# Patient Record
Sex: FEMALE | Race: WHITE | NOT HISPANIC OR LATINO | URBAN - METROPOLITAN AREA
[De-identification: names, ages, dates, MRNs, and addresses within clinical notes are randomized per-mention and may not be internally consistent; named-entity substitution may affect disease eponyms.]

---

## 2018-08-21 ENCOUNTER — COMMUNICATION - HEALTHEAST (OUTPATIENT)
Dept: TELEHEALTH | Facility: CLINIC | Age: 43
End: 2018-08-21

## 2018-08-21 ENCOUNTER — OFFICE VISIT - HEALTHEAST (OUTPATIENT)
Dept: FAMILY MEDICINE | Facility: CLINIC | Age: 43
End: 2018-08-21

## 2018-08-21 DIAGNOSIS — R53.82 CHRONIC FATIGUE: ICD-10-CM

## 2018-08-21 LAB
BASOPHILS # BLD AUTO: 0 THOU/UL (ref 0–0.2)
BASOPHILS NFR BLD AUTO: 1 % (ref 0–2)
EOSINOPHIL # BLD AUTO: 0.1 THOU/UL (ref 0–0.4)
EOSINOPHIL NFR BLD AUTO: 2 % (ref 0–6)
ERYTHROCYTE [DISTWIDTH] IN BLOOD BY AUTOMATED COUNT: 12 % (ref 11–14.5)
FERRITIN SERPL-MCNC: 17 NG/ML (ref 10–130)
HCT VFR BLD AUTO: 37.6 % (ref 35–47)
HGB BLD-MCNC: 12.7 G/DL (ref 12–16)
LYMPHOCYTES # BLD AUTO: 1.5 THOU/UL (ref 0.8–4.4)
LYMPHOCYTES NFR BLD AUTO: 23 % (ref 20–40)
MCH RBC QN AUTO: 29.2 PG (ref 27–34)
MCHC RBC AUTO-ENTMCNC: 33.8 G/DL (ref 32–36)
MCV RBC AUTO: 86 FL (ref 80–100)
MONOCYTES # BLD AUTO: 0.4 THOU/UL (ref 0–0.9)
MONOCYTES NFR BLD AUTO: 7 % (ref 2–10)
NEUTROPHILS # BLD AUTO: 4.4 THOU/UL (ref 2–7.7)
NEUTROPHILS NFR BLD AUTO: 68 % (ref 50–70)
PLATELET # BLD AUTO: 264 THOU/UL (ref 140–440)
PMV BLD AUTO: 7.2 FL (ref 7–10)
RBC # BLD AUTO: 4.35 MILL/UL (ref 3.8–5.4)
TSH SERPL DL<=0.005 MIU/L-ACNC: 0.99 UIU/ML (ref 0.3–5)
WBC: 6.5 THOU/UL (ref 4–11)

## 2018-08-21 RX ORDER — LORAZEPAM 0.5 MG/1
TABLET ORAL
Status: SHIPPED | COMMUNITY
Start: 2018-01-10

## 2018-08-21 ASSESSMENT — MIFFLIN-ST. JEOR: SCORE: 1317.87

## 2018-08-22 ENCOUNTER — COMMUNICATION - HEALTHEAST (OUTPATIENT)
Dept: FAMILY MEDICINE | Facility: CLINIC | Age: 43
End: 2018-08-22

## 2018-08-22 LAB — 25(OH)D3 SERPL-MCNC: 22 NG/ML (ref 30–80)

## 2018-08-24 ENCOUNTER — COMMUNICATION - HEALTHEAST (OUTPATIENT)
Dept: FAMILY MEDICINE | Facility: CLINIC | Age: 43
End: 2018-08-24

## 2019-05-15 ENCOUNTER — RECORDS - HEALTHEAST (OUTPATIENT)
Dept: ADMINISTRATIVE | Facility: OTHER | Age: 44
End: 2019-05-15

## 2019-10-03 ENCOUNTER — OFFICE VISIT - HEALTHEAST (OUTPATIENT)
Dept: FAMILY MEDICINE | Facility: CLINIC | Age: 44
End: 2019-10-03

## 2019-10-03 ENCOUNTER — HOSPITAL ENCOUNTER (OUTPATIENT)
Dept: LAB | Age: 44
Setting detail: SPECIMEN
Discharge: HOME | End: 2019-10-03

## 2019-10-03 DIAGNOSIS — G93.5 CHIARI I MALFORMATION (H): ICD-10-CM

## 2019-10-03 DIAGNOSIS — R55 VASOVAGAL EPISODE: ICD-10-CM

## 2019-10-03 DIAGNOSIS — E55.9 VITAMIN D DEFICIENCY: ICD-10-CM

## 2019-10-03 DIAGNOSIS — Z23 NEED FOR IMMUNIZATION AGAINST INFLUENZA: ICD-10-CM

## 2019-10-03 DIAGNOSIS — Z23 NEED FOR TD VACCINE: ICD-10-CM

## 2019-10-03 DIAGNOSIS — E61.1 IRON DEFICIENCY: ICD-10-CM

## 2019-10-03 LAB
BASOPHILS # BLD AUTO: 0 THOU/UL (ref 0–0.2)
BASOPHILS NFR BLD AUTO: 0 % (ref 0–2)
EOSINOPHIL # BLD AUTO: 0.1 THOU/UL (ref 0–0.4)
EOSINOPHIL NFR BLD AUTO: 2 % (ref 0–6)
ERYTHROCYTE [DISTWIDTH] IN BLOOD BY AUTOMATED COUNT: 11.7 % (ref 11–14.5)
FERRITIN SERPL-MCNC: 7 NG/ML (ref 10–130)
HCT VFR BLD AUTO: 39.1 % (ref 35–47)
HGB BLD-MCNC: 13.5 G/DL (ref 12–16)
IRON SATN MFR SERPL: 37 % (ref 20–50)
IRON SERPL-MCNC: 155 UG/DL (ref 42–175)
LYMPHOCYTES # BLD AUTO: 1.5 THOU/UL (ref 0.8–4.4)
LYMPHOCYTES NFR BLD AUTO: 30 % (ref 20–40)
MCH RBC QN AUTO: 28.7 PG (ref 27–34)
MCHC RBC AUTO-ENTMCNC: 34.4 G/DL (ref 32–36)
MCV RBC AUTO: 83 FL (ref 80–100)
MONOCYTES # BLD AUTO: 0.4 THOU/UL (ref 0–0.9)
MONOCYTES NFR BLD AUTO: 8 % (ref 2–10)
NEUTROPHILS # BLD AUTO: 3.1 THOU/UL (ref 2–7.7)
NEUTROPHILS NFR BLD AUTO: 60 % (ref 50–70)
PLATELET # BLD AUTO: 250 THOU/UL (ref 140–440)
PMV BLD AUTO: 7.7 FL (ref 7–10)
RBC # BLD AUTO: 4.68 MILL/UL (ref 3.8–5.4)
TIBC SERPL-MCNC: 421 UG/DL (ref 313–563)
TRANSFERRIN SERPL-MCNC: 337 MG/DL (ref 212–360)
WBC: 5.1 THOU/UL (ref 4–11)

## 2019-10-03 ASSESSMENT — PATIENT HEALTH QUESTIONNAIRE - PHQ9: SUM OF ALL RESPONSES TO PHQ QUESTIONS 1-9: 3

## 2019-10-03 ASSESSMENT — MIFFLIN-ST. JEOR: SCORE: 1286.4

## 2019-10-04 ENCOUNTER — COMMUNICATION - HEALTHEAST (OUTPATIENT)
Dept: FAMILY MEDICINE | Facility: CLINIC | Age: 44
End: 2019-10-04

## 2019-10-04 DIAGNOSIS — E61.1 IRON DEFICIENCY: ICD-10-CM

## 2019-10-04 LAB
25(OH)D3 SERPL-MCNC: 28.4 NG/ML (ref 30–80)
25(OH)D3 SERPL-MCNC: 28.4 NG/ML (ref 30–80)

## 2019-10-09 ENCOUNTER — COMMUNICATION - HEALTHEAST (OUTPATIENT)
Dept: ADMINISTRATIVE | Facility: HOSPITAL | Age: 44
End: 2019-10-09

## 2019-10-23 ENCOUNTER — OFFICE VISIT - HEALTHEAST (OUTPATIENT)
Dept: ONCOLOGY | Facility: CLINIC | Age: 44
End: 2019-10-23

## 2019-10-23 DIAGNOSIS — D50.0 IRON DEFICIENCY ANEMIA DUE TO CHRONIC BLOOD LOSS: ICD-10-CM

## 2019-10-23 DIAGNOSIS — N92.0 MENORRHAGIA WITH REGULAR CYCLE: ICD-10-CM

## 2019-10-23 DIAGNOSIS — R53.82 CHRONIC FATIGUE: ICD-10-CM

## 2019-10-23 DIAGNOSIS — R35.89 POLYURIA: ICD-10-CM

## 2019-10-23 LAB
FERRITIN SERPL-MCNC: 5 NG/ML (ref 10–130)
FOLATE SERPL-MCNC: 7.7 NG/ML
HBA1C MFR BLD: 5 % (ref 4.2–6.1)
IRON SATN MFR SERPL: 13 % (ref 20–50)
IRON SERPL-MCNC: 59 UG/DL (ref 42–175)
TIBC SERPL-MCNC: 449 UG/DL (ref 313–563)
TRANSFERRIN SERPL-MCNC: 360 MG/DL (ref 212–360)
TSH SERPL DL<=0.005 MIU/L-ACNC: 1.17 UIU/ML (ref 0.3–5)
VIT B12 SERPL-MCNC: 366 PG/ML (ref 213–816)

## 2019-10-23 ASSESSMENT — MIFFLIN-ST. JEOR: SCORE: 1275.69

## 2019-11-26 ENCOUNTER — AMBULATORY - HEALTHEAST (OUTPATIENT)
Dept: INFUSION THERAPY | Facility: CLINIC | Age: 44
End: 2019-11-26

## 2019-11-26 DIAGNOSIS — D50.0 IRON DEFICIENCY ANEMIA DUE TO CHRONIC BLOOD LOSS: ICD-10-CM

## 2019-11-26 LAB
BASOPHILS # BLD AUTO: 0.1 THOU/UL (ref 0–0.2)
BASOPHILS NFR BLD AUTO: 1 % (ref 0–2)
EOSINOPHIL # BLD AUTO: 0.1 THOU/UL (ref 0–0.4)
EOSINOPHIL NFR BLD AUTO: 2 % (ref 0–6)
ERYTHROCYTE [DISTWIDTH] IN BLOOD BY AUTOMATED COUNT: 12.6 % (ref 11–14.5)
FERRITIN SERPL-MCNC: 8 NG/ML (ref 10–130)
HCT VFR BLD AUTO: 39.1 % (ref 35–47)
HGB BLD-MCNC: 12.5 G/DL (ref 12–16)
IRON SATN MFR SERPL: 38 % (ref 20–50)
IRON SERPL-MCNC: 149 UG/DL (ref 42–175)
LYMPHOCYTES # BLD AUTO: 1.3 THOU/UL (ref 0.8–4.4)
LYMPHOCYTES NFR BLD AUTO: 28 % (ref 20–40)
MCH RBC QN AUTO: 28.3 PG (ref 27–34)
MCHC RBC AUTO-ENTMCNC: 32 G/DL (ref 32–36)
MCV RBC AUTO: 89 FL (ref 80–100)
MONOCYTES # BLD AUTO: 0.4 THOU/UL (ref 0–0.9)
MONOCYTES NFR BLD AUTO: 9 % (ref 2–10)
NEUTROPHILS # BLD AUTO: 2.6 THOU/UL (ref 2–7.7)
NEUTROPHILS NFR BLD AUTO: 59 % (ref 50–70)
PLATELET # BLD AUTO: 255 THOU/UL (ref 140–440)
PMV BLD AUTO: 9.5 FL (ref 8.5–12.5)
RBC # BLD AUTO: 4.41 MILL/UL (ref 3.8–5.4)
TIBC SERPL-MCNC: 391 UG/DL (ref 313–563)
TRANSFERRIN SERPL-MCNC: 312 MG/DL (ref 212–360)
WBC: 4.4 THOU/UL (ref 4–11)

## 2019-12-03 ENCOUNTER — OFFICE VISIT - HEALTHEAST (OUTPATIENT)
Dept: ONCOLOGY | Facility: CLINIC | Age: 44
End: 2019-12-03

## 2019-12-03 DIAGNOSIS — M54.89 PAIN IN LEFT PARASPINAL REGION: ICD-10-CM

## 2019-12-03 DIAGNOSIS — D50.0 IRON DEFICIENCY ANEMIA DUE TO CHRONIC BLOOD LOSS: ICD-10-CM

## 2019-12-03 DIAGNOSIS — R53.83 FATIGUE, UNSPECIFIED TYPE: ICD-10-CM

## 2019-12-03 LAB
ALBUMIN UR-MCNC: NEGATIVE MG/DL
APPEARANCE UR: CLEAR
BACTERIA #/AREA URNS HPF: ABNORMAL HPF
BILIRUB UR QL STRIP: NEGATIVE
COLOR UR AUTO: YELLOW
GLUCOSE UR STRIP-MCNC: NEGATIVE MG/DL
HGB UR QL STRIP: NEGATIVE
KETONES UR STRIP-MCNC: NEGATIVE MG/DL
LEUKOCYTE ESTERASE UR QL STRIP: ABNORMAL
MUCOUS THREADS #/AREA URNS LPF: ABNORMAL LPF
NITRATE UR QL: NEGATIVE
PH UR STRIP: 6 [PH] (ref 4.5–8)
RBC #/AREA URNS AUTO: ABNORMAL HPF
SP GR UR STRIP: 1.01 (ref 1–1.03)
SQUAMOUS #/AREA URNS AUTO: ABNORMAL LPF
UROBILINOGEN UR STRIP-ACNC: ABNORMAL
WBC #/AREA URNS AUTO: ABNORMAL HPF

## 2019-12-04 ENCOUNTER — COMMUNICATION - HEALTHEAST (OUTPATIENT)
Dept: SCHEDULING | Facility: CLINIC | Age: 44
End: 2019-12-04

## 2019-12-04 LAB — BACTERIA SPEC CULT: NO GROWTH

## 2019-12-06 ENCOUNTER — OFFICE VISIT - HEALTHEAST (OUTPATIENT)
Dept: FAMILY MEDICINE | Facility: CLINIC | Age: 44
End: 2019-12-06

## 2019-12-06 DIAGNOSIS — R10.9 ABDOMINAL DISCOMFORT IN LEFT FLANK: ICD-10-CM

## 2019-12-06 ASSESSMENT — ANXIETY QUESTIONNAIRES
7. FEELING AFRAID AS IF SOMETHING AWFUL MIGHT HAPPEN: NOT AT ALL
IF YOU CHECKED OFF ANY PROBLEMS ON THIS QUESTIONNAIRE, HOW DIFFICULT HAVE THESE PROBLEMS MADE IT FOR YOU TO DO YOUR WORK, TAKE CARE OF THINGS AT HOME, OR GET ALONG WITH OTHER PEOPLE: NOT DIFFICULT AT ALL
3. WORRYING TOO MUCH ABOUT DIFFERENT THINGS: NOT AT ALL
1. FEELING NERVOUS, ANXIOUS, OR ON EDGE: SEVERAL DAYS
5. BEING SO RESTLESS THAT IT IS HARD TO SIT STILL: SEVERAL DAYS
GAD7 TOTAL SCORE: 3
2. NOT BEING ABLE TO STOP OR CONTROL WORRYING: NOT AT ALL
4. TROUBLE RELAXING: SEVERAL DAYS
6. BECOMING EASILY ANNOYED OR IRRITABLE: NOT AT ALL

## 2019-12-06 ASSESSMENT — PATIENT HEALTH QUESTIONNAIRE - PHQ9: SUM OF ALL RESPONSES TO PHQ QUESTIONS 1-9: 4

## 2019-12-09 ENCOUNTER — HOSPITAL ENCOUNTER (OUTPATIENT)
Dept: ULTRASOUND IMAGING | Facility: CLINIC | Age: 44
Discharge: HOME | End: 2019-12-09
Attending: FAMILY MEDICINE

## 2019-12-09 DIAGNOSIS — R10.9 ABDOMINAL DISCOMFORT IN LEFT FLANK: ICD-10-CM

## 2019-12-10 ENCOUNTER — AMBULATORY - HEALTHEAST (OUTPATIENT)
Dept: FAMILY MEDICINE | Facility: CLINIC | Age: 44
End: 2019-12-10

## 2019-12-10 DIAGNOSIS — R10.9 ABDOMINAL DISCOMFORT IN LEFT FLANK: ICD-10-CM

## 2019-12-10 DIAGNOSIS — R93.429 ABNORMAL ULTRASOUND OF KIDNEY: ICD-10-CM

## 2019-12-11 ENCOUNTER — HOSPITAL ENCOUNTER (OUTPATIENT)
Dept: CT IMAGING | Facility: CLINIC | Age: 44
Discharge: HOME | End: 2019-12-11
Attending: FAMILY MEDICINE

## 2019-12-11 ENCOUNTER — COMMUNICATION - HEALTHEAST (OUTPATIENT)
Dept: FAMILY MEDICINE | Facility: CLINIC | Age: 44
End: 2019-12-11

## 2019-12-11 ENCOUNTER — COMMUNICATION - HEALTHEAST (OUTPATIENT)
Dept: SCHEDULING | Facility: CLINIC | Age: 44
End: 2019-12-11

## 2019-12-11 DIAGNOSIS — R93.429 ABNORMAL ULTRASOUND OF KIDNEY: ICD-10-CM

## 2019-12-11 DIAGNOSIS — R10.9 ABDOMINAL DISCOMFORT IN LEFT FLANK: ICD-10-CM

## 2019-12-13 ENCOUNTER — AMBULATORY - HEALTHEAST (OUTPATIENT)
Dept: LAB | Facility: CLINIC | Age: 44
End: 2019-12-13

## 2019-12-13 DIAGNOSIS — R10.9 ABDOMINAL DISCOMFORT IN LEFT FLANK: ICD-10-CM

## 2019-12-13 DIAGNOSIS — R93.429 ABNORMAL ULTRASOUND OF KIDNEY: ICD-10-CM

## 2019-12-13 LAB
ANION GAP SERPL CALCULATED.3IONS-SCNC: 8 MMOL/L (ref 5–18)
BUN SERPL-MCNC: 12 MG/DL (ref 8–22)
CALCIUM SERPL-MCNC: 9.5 MG/DL (ref 8.5–10.5)
CHLORIDE BLD-SCNC: 103 MMOL/L (ref 98–107)
CO2 SERPL-SCNC: 27 MMOL/L (ref 22–31)
CREAT SERPL-MCNC: 0.81 MG/DL (ref 0.6–1.1)
GFR SERPL CREATININE-BSD FRML MDRD: >60 ML/MIN/1.73M2
GLUCOSE BLD-MCNC: 88 MG/DL (ref 70–125)
POTASSIUM BLD-SCNC: 4.3 MMOL/L (ref 3.5–5)
SODIUM SERPL-SCNC: 138 MMOL/L (ref 136–145)

## 2020-06-25 ENCOUNTER — OFFICE VISIT - HEALTHEAST (OUTPATIENT)
Dept: FAMILY MEDICINE | Facility: CLINIC | Age: 45
End: 2020-06-25

## 2020-06-25 DIAGNOSIS — H00.011 HORDEOLUM EXTERNUM OF RIGHT UPPER EYELID: ICD-10-CM

## 2020-08-18 ENCOUNTER — COMMUNICATION - HEALTHEAST (OUTPATIENT)
Dept: FAMILY MEDICINE | Facility: CLINIC | Age: 45
End: 2020-08-18

## 2020-08-18 DIAGNOSIS — Z86.2 HX OF IRON DEFICIENCY ANEMIA: ICD-10-CM

## 2020-08-18 DIAGNOSIS — Z86.2 HISTORY OF ANEMIA: ICD-10-CM

## 2020-08-28 ENCOUNTER — AMBULATORY - HEALTHEAST (OUTPATIENT)
Dept: LAB | Facility: CLINIC | Age: 45
End: 2020-08-28

## 2020-08-28 DIAGNOSIS — Z86.2 HX OF IRON DEFICIENCY ANEMIA: ICD-10-CM

## 2020-08-28 LAB
ERYTHROCYTE [DISTWIDTH] IN BLOOD BY AUTOMATED COUNT: 13 % (ref 11–14.5)
FERRITIN SERPL-MCNC: 5 NG/ML (ref 10–130)
HCT VFR BLD AUTO: 37.2 % (ref 35–47)
HGB BLD-MCNC: 12.6 G/DL (ref 12–16)
IRON SATN MFR SERPL: 9 % (ref 20–50)
IRON SERPL-MCNC: 38 UG/DL (ref 42–175)
MCH RBC QN AUTO: 28.1 PG (ref 27–34)
MCHC RBC AUTO-ENTMCNC: 33.8 G/DL (ref 32–36)
MCV RBC AUTO: 83 FL (ref 80–100)
PLATELET # BLD AUTO: 235 THOU/UL (ref 140–440)
PMV BLD AUTO: 7.4 FL (ref 7–10)
RBC # BLD AUTO: 4.48 MILL/UL (ref 3.8–5.4)
TIBC SERPL-MCNC: 403 UG/DL (ref 313–563)
TRANSFERRIN SERPL-MCNC: 323 MG/DL (ref 212–360)
WBC: 5.1 THOU/UL (ref 4–11)

## 2020-09-08 ENCOUNTER — COMMUNICATION - HEALTHEAST (OUTPATIENT)
Dept: FAMILY MEDICINE | Facility: CLINIC | Age: 45
End: 2020-09-08

## 2020-09-09 ENCOUNTER — COMMUNICATION - HEALTHEAST (OUTPATIENT)
Dept: FAMILY MEDICINE | Facility: CLINIC | Age: 45
End: 2020-09-09

## 2020-09-25 ENCOUNTER — RECORDS - HEALTHEAST (OUTPATIENT)
Dept: ADMINISTRATIVE | Facility: OTHER | Age: 45
End: 2020-09-25

## 2020-10-06 ENCOUNTER — COMMUNICATION - HEALTHEAST (OUTPATIENT)
Dept: FAMILY MEDICINE | Facility: CLINIC | Age: 45
End: 2020-10-06

## 2020-10-06 ENCOUNTER — AMBULATORY - HEALTHEAST (OUTPATIENT)
Dept: SURGERY | Facility: AMBULATORY SURGERY CENTER | Age: 45
End: 2020-10-06

## 2020-10-06 DIAGNOSIS — Z11.59 ENCOUNTER FOR SCREENING FOR OTHER VIRAL DISEASES: ICD-10-CM

## 2020-10-14 ENCOUNTER — OFFICE VISIT - HEALTHEAST (OUTPATIENT)
Dept: FAMILY MEDICINE | Facility: CLINIC | Age: 45
End: 2020-10-14

## 2020-10-14 DIAGNOSIS — N93.9 ABNORMAL UTERINE BLEEDING: ICD-10-CM

## 2020-10-14 DIAGNOSIS — Z01.818 PREOP EXAMINATION: ICD-10-CM

## 2020-10-14 RX ORDER — VILAZODONE HYDROCHLORIDE 40 MG/1
TABLET ORAL
Status: SHIPPED | COMMUNITY
Start: 2020-06-01

## 2020-10-14 ASSESSMENT — MIFFLIN-ST. JEOR: SCORE: 1252.1

## 2020-10-16 ENCOUNTER — AMBULATORY - HEALTHEAST (OUTPATIENT)
Dept: LAB | Facility: CLINIC | Age: 45
End: 2020-10-16

## 2020-10-16 DIAGNOSIS — Z11.59 ENCOUNTER FOR SCREENING FOR OTHER VIRAL DISEASES: ICD-10-CM

## 2020-10-16 ASSESSMENT — MIFFLIN-ST. JEOR: SCORE: 1260.04

## 2020-10-18 ENCOUNTER — COMMUNICATION - HEALTHEAST (OUTPATIENT)
Dept: SCHEDULING | Facility: CLINIC | Age: 45
End: 2020-10-18

## 2020-10-20 ENCOUNTER — ANESTHESIA - HEALTHEAST (OUTPATIENT)
Dept: SURGERY | Facility: AMBULATORY SURGERY CENTER | Age: 45
End: 2020-10-20

## 2020-10-21 ENCOUNTER — SURGERY - HEALTHEAST (OUTPATIENT)
Dept: SURGERY | Facility: AMBULATORY SURGERY CENTER | Age: 45
End: 2020-10-21

## 2020-10-21 ASSESSMENT — MIFFLIN-ST. JEOR: SCORE: 1257.77

## 2020-11-01 ENCOUNTER — COMMUNICATION - HEALTHEAST (OUTPATIENT)
Dept: FAMILY MEDICINE | Facility: CLINIC | Age: 45
End: 2020-11-01

## 2020-11-01 DIAGNOSIS — M62.838 MUSCLE SPASM: ICD-10-CM

## 2020-11-01 DIAGNOSIS — M54.6 ACUTE RIGHT-SIDED THORACIC BACK PAIN: ICD-10-CM

## 2020-11-04 RX ORDER — CYCLOBENZAPRINE HCL 5 MG
5-10 TABLET ORAL 3 TIMES DAILY PRN
Qty: 30 TABLET | Refills: 0 | Status: SHIPPED | OUTPATIENT
Start: 2020-11-04

## 2020-12-18 ENCOUNTER — RECORDS - HEALTHEAST (OUTPATIENT)
Dept: ADMINISTRATIVE | Facility: OTHER | Age: 45
End: 2020-12-18

## 2021-05-29 ENCOUNTER — HEALTH MAINTENANCE LETTER (OUTPATIENT)
Age: 46
End: 2021-05-29

## 2021-06-08 ENCOUNTER — HOSPITAL ENCOUNTER (EMERGENCY)
Dept: EMERGENCY MEDICINE | Facility: CLINIC | Age: 46
Discharge: HOME | End: 2021-06-08
Admitting: EMERGENCY MEDICINE
Payer: COMMERCIAL

## 2021-06-08 DIAGNOSIS — M79.662 PAIN OF LEFT LOWER LEG: ICD-10-CM

## 2021-06-24 VITALS
BODY MASS INDEX: 22.86 KG/M2 | WEIGHT: 143.8 LBS | OXYGEN SATURATION: 97 % | HEIGHT: 67 IN | SYSTOLIC BLOOD PRESSURE: 129 MMHG | SYSTOLIC BLOOD PRESSURE: 122 MMHG | DIASTOLIC BLOOD PRESSURE: 74 MMHG | DIASTOLIC BLOOD PRESSURE: 60 MMHG | HEART RATE: 86 BPM | OXYGEN SATURATION: 98 % | HEART RATE: 75 BPM | TEMPERATURE: 97.5 F | WEIGHT: 140.44 LBS | SYSTOLIC BLOOD PRESSURE: 110 MMHG | BODY MASS INDEX: 21.14 KG/M2 | BODY MASS INDEX: 22.33 KG/M2 | TEMPERATURE: 97.7 F | WEIGHT: 134.7 LBS | HEART RATE: 74 BPM | DIASTOLIC BLOOD PRESSURE: 77 MMHG

## 2021-06-24 VITALS
HEIGHT: 67 IN | TEMPERATURE: 97.5 F | BODY MASS INDEX: 21.51 KG/M2 | SYSTOLIC BLOOD PRESSURE: 112 MMHG | DIASTOLIC BLOOD PRESSURE: 60 MMHG | HEART RATE: 69 BPM | OXYGEN SATURATION: 99 % | WEIGHT: 137.06 LBS

## 2021-06-24 VITALS
WEIGHT: 129.5 LBS | DIASTOLIC BLOOD PRESSURE: 60 MMHG | SYSTOLIC BLOOD PRESSURE: 104 MMHG | HEART RATE: 80 BPM | BODY MASS INDEX: 20.33 KG/M2 | TEMPERATURE: 97.1 F | HEIGHT: 67 IN | OXYGEN SATURATION: 94 %

## 2021-06-24 VITALS — BODY MASS INDEX: 22.6 KG/M2 | HEIGHT: 67 IN | WEIGHT: 144 LBS

## 2021-06-24 VITALS — BODY MASS INDEX: 20.25 KG/M2 | WEIGHT: 129 LBS | HEIGHT: 67 IN

## 2021-06-24 PROBLEM — N92.0 MENORRHAGIA WITH REGULAR CYCLE: Status: ACTIVE | Noted: 2019-10-23

## 2021-06-24 PROBLEM — G93.5 CHIARI I MALFORMATION (H): Status: ACTIVE | Noted: 2019-10-03

## 2021-06-24 ASSESSMENT — PATIENT HEALTH QUESTIONNAIRE - PHQ9
SUM OF ALL RESPONSES TO PHQ QUESTIONS 1-9: 4
SUM OF ALL RESPONSES TO PHQ QUESTIONS 1-9: 3

## 2021-06-24 NOTE — TELEPHONE ENCOUNTER
RN Assessment/Reason for Call:   Okay to leave Detailed Message  Paradise calling in, sent a My Chart for message.   U/S 2 small lesions left kidney.  Waiting for CT scan.  Friend recommend they check her kidneys.  Pain/dull ache.    RN Action/Disposition:  Protocol recommends home care;if no relief from pain meds call back in 2 hrs.;& see Dr in 24 hrs.  Call back if worse symptoms  Discussed home care measures.  Agrees to plan.     Jeni Ellison RN    [facility] Triage/med refill  12/11/2019  2:34 PM        Reason for Disposition    Back pain    Protocols used: BACK PAIN-A-AH

## 2021-06-24 NOTE — PROGRESS NOTES
"Paradise Narvaez is a 45 y.o. female who is being evaluated via a billable video visit.      The patient has been notified of following:     \"This video visit will be conducted via a call between you and your physician/provider. We have found that certain health care needs can be provided without the need for an in-person physical exam.  This service lets us provide the care you need with a video conversation.  If a prescription is necessary we can send it directly to your pharmacy.  If lab work is needed we can place an order for that and you can then stop by our lab to have the test done at a later time.    Video visits are billed at different rates depending on your insurance coverage. Please reach out to your insurance provider with any questions.    If during the course of the call the physician/provider feels a video visit is not appropriate, you will not be charged for this service.\"    Patient has given verbal consent to a Video visit? Yes    Will anyone else be joining your video visit? No        Video Start Time: 4:53 PM    Additional provider notes: eye concerns      Assessment/Plan:     Patient presents via virtual visit with what appears to be a stye based on her endorsement.  It was difficult to assess visually view of the virtual appointment type, but her symptoms seem to be consistent with a stye.  Symptomatic treatment with warm compresses, baby shampoo, and monitoring for worsening symptoms were all described in depth.    There are no diagnoses linked to this encounter.    Discontinued Medications:  There are no discontinued medications.    Return to clinic PRN.    This note has been dictated using voice recognition software. Any grammatical or context distortions are unintentional and inherent to the the software.     Cherry Owens MD  Family Medicine [facility]      Subjective:      Paradise Narvaez is a 45 y.o. female who presents to clinic for eye concerns.     Patient presents to " clinic for 2 days of tightness behind her right eye.  It appears inflamed to her.  She notices a focal swelling in the middle of patient's right upper eyelid.  It recently became itchy.  She wonders if it was a plugged tear duct.  Her conjunctiva are essentially normal in color.  The sclera are not pink.  She is not having any allergy symptoms.  Patient denies blurry vision, changes in vision, or significant pain.    Past Medical History, Family History, and Social History reviewed.   Social History     Tobacco Use   Smoking Status Never Smoker   Smokeless Tobacco Never Used       Review of systems is as stated in HPI.    Objective:   Unable to obtain significant objective data due to virtual visit status.       Video-Visit Details    Type of service:  Video Visit    Video End Time (time video stopped): 5:02  Originating Location (pt. Location): Home    Distant Location (provider location):  [facility]     Platform used for Video Visit: TrevorParadine      Cherry Owens MD

## 2021-06-24 NOTE — CONSULTS
[facility] Hematology and Oncology Consult Note    Patient: Paradise Narvaez  MRN: [id number]  Date of Service: 10/23/2019        Reason for Visit    I was consulted by Dr.Allison Gardner  regarding iron deficiency anemia.    Assessment/Plan    Ms. Paradise Narvaez is a 44 y.o. woman with iron deficiency anemia.  This is a long-standing issue.  She did receive IV Venofer for the first time in 2010 and with that her blood counts came up nicely and she felt very good.  She says that she has received IV iron with a couple of times since then.  The last episode was at least a couple of years ago.  She has been feeling more fatigued recently and she had an episode of vasovagal syncope about 3 weeks ago.  When she saw Dr. Gardner she had her blood counts checked.  Hemoglobin was normal at 13.5.  She has not yet microcytic.  However her iron panel and ferritin showed that she has become iron deficient.  I see that she had a raised a ferritin level of 88 on 9/13/2011.  It looks like she has been slowly becoming iron deficient over the last several months since the last IV iron infusion.  On the other hand there is really no evidence of a iron absorption problem.  She had an upper GI endoscopy and work-up for celiac disease done in 2010 which came back negative.    I have gone through her past medical records in detail.  I have reviewed her labs.    1.  I had a long discussion with her today and educated her on the physiology of iron in the body.  I explained to her that most people have just enough iron.  Most women are on the edge of iron deficiency because of the monthly menstrual blood loss and because of childbirth.  In her case, I really do not think that there is a problem with iron absorption.  That is why it has taken a couple of years for her to reach the point where she is iron deficient after the last IV iron.  If she really had a problem with iron absorption she would have become anemic much earlier.  She has been  absorbing the iron from her diet and compensating for the iron deficiency.  The underlying cause of the iron deficiency is really the heavy menstrual bleeding from menorrhagia.  That underlying problem will need to be fixed for her overall health long-term.  I asked her to go back and talk to her OB/GYN about her treatment options.  I think the tranexamic acid is only minimally helpful.  She will need something more than that.  I discussed with her that in my opinion, it is worth considering the oral contraceptive pills from the risk benefit point of view.  If that fails, then we can consider possibilities like endometrial ablation etc.  She voiced understanding.    2.  I discussed with her that IV iron infusion is a possibility but I would like to go for less invasive treatment options first.  IV iron always comes with the risk of side effects like allergic reactions and infusion site reactions and associated expenses also.  Before that does give a good try with oral iron supplementation.  She was agreeable to the plan.  I asked her to take a different over-the-counter iron supplement tablet.  She can start with 1 tablet a day.  She should take it with food to prevent stomach upset.  If she has constipation she can take any over-the-counter laxative.  After a week if she feels that she is tolerating it well then she can try to increase the iron tablet to 2 times a day but still that should be taken with another meal.  After another week if she feels that she is tolerating it well she can even increase it to 3 times a day.  She voiced understanding.    3.  I have given her a long list of iron-containing foods printed out from up-to-date.  I advised her to incorporate all that into her diet.    4.  I discussed with her that her symptoms like polyuria, fatigue, cold sensitivity, and even the menorrhagia could reflect some other problems.  I think we should check vitamin B12 levels, folic acid level, a thyroid cascade  and hemoglobin A 1C.  She was agreeable to the plan.  We will have the labs drawn today.  If anything comes abnormal I will call and let her know.    5.  I would like to see her back in clinic in about a month's time.  At least a day before she sees me she will have her blood counts drawn and also a ferritin and TIBC panel.  If she turns out that she is still iron deficient without improvement, then we can go for IV iron, likely with 2 doses of Feraheme.    Addendum:  Recent Results (from the past 24 hour(s))   Ferritin    Collection Time: 10/23/19 10:42 AM   Result Value Ref Range    Ferritin 5 (L) 10 - 130 ng/mL   Iron and Transferrin Iron Binding Capacity    Collection Time: 10/23/19 10:42 AM   Result Value Ref Range    Iron 59 42 - 175 ug/dL    Transferrin 360 212 - 360 mg/dL    Transferrin Saturation, Calculated 13 (L) 20 - 50 %    Transferrin IBC, Calculated 449 313 - 563 ug/dL   Vitamin B12    Collection Time: 10/23/19 10:42 AM   Result Value Ref Range    Vitamin B-12 366 213 - 816 pg/mL   Folate, Serum    Collection Time: 10/23/19 10:42 AM   Result Value Ref Range    Folate 7.7 >=3.5 ng/mL   Thyroid Cascade    Collection Time: 10/23/19 10:42 AM   Result Value Ref Range    TSH 1.17 0.30 - 5.00 uIU/mL   Glycosylated Hemoglobin A1C    Collection Time: 10/23/19 10:42 AM   Result Value Ref Range    Hemoglobin A1c 5.0 4.2 - 6.1 %       A&P:    I called and I discussed the results of the lab work from today with the patient over the phone.  I discussed with her that her ferritin and iron saturation has decreased a bit more in the last 3 weeks.  The serum folate level is normal.  The vitamin B12 level was at the lower end of normal.    The hemoglobin A1c and the TSH levels were normal.  Thus I do not have a concern for diabetes or hypothyroidism.    I advised her that since her vitamin B12 level is at the lower end of normal, she can take a prenatal vitamin which will give her B vitamins and iron once a day.  If she  tolerates that well, then she can add on 1 or 2 tablets of iron supplement after that as we discussed earlier.  She voiced understanding.  She will be coming back to follow-up with me in a month's time.      ECOG Performance   ECOG Performance Status: 0  Distress Assessment  Distress Assessment Score: 5        Problem List    1. Iron deficiency anemia due to chronic blood loss  Ferritin    Iron and Transferrin Iron Binding Capacity    Vitamin B12    Folate, Serum    Thyroid Cascade    HM1(CBC and Differential)    Ferritin    Iron and Transferrin Iron Binding Capacity   2. Menorrhagia with regular cycle  Thyroid Cascade   3. Chronic fatigue  Thyroid Cascade    Glycosylated Hemoglobin A1C   4. Polyuria  Glycosylated Hemoglobin A1C           CC: Maru Narvaez, CNP      ______________________________________________________________________________    History    Ms. Paradise Narvaez is a 44-year-old woman who is here for the consultation alone.    She tells me that she did have a problem with iron deficiency anemia years ago.  In 2010 she was seen by another hematologist for iron deficiency anemia.  At that time it was thought that she was not absorbing iron properly.  She remembers that she did have an upper endoscopy done which did not show anything abnormal.  She says that the work-up for celiac disease etc. came back negative.  She was given a 5 doses of IV Venofer and her hemoglobin came up nicely to about 13.  She felt really good.  Since then she says that she has had 2 more episodes of iron infusion given every couple of years.  She thinks that she did have the last iron infusion a couple of years ago.  She is not exactly sure about the timing.    Recently she has been feeling much worse.  She is becoming more fatigued.  Her mood is going down.  About 3 weeks ago she had an episode of fainting which is thought to be a vasovagal syncope.  She is losing hair.  Her extremities are feeling cold.  Her mood is down.   She is having some headaches off and on.  She has noticed that she is passing urine frequently.  About every other day she wakes up at night needing to pass urine.    She says that she is not losing weight.  She is still able to manage her work.    Sometimes she feels short of breath on effort.  Sometimes she has some palpitations but no chest pain.  Sometimes she feels dizzy.    I asked her about her diet and she says that she eats a normal diet.  There is no food that she avoids.  She eats both vegetables and meat including red meat.    She admits that she has a problem with heavy menstrual bleeding for a long time.  For several years she has been using Tranexamic acid pills.  She feels that this is reducing the flow a bit.  She says that her menstrual periods are quite regular coming monthly.  The first 3 days she has heavy flow.  She changes a tampon every 1/2 hours.  After the first 3 days the flow slows down and last for another 3 days.    She says that she has been hesitant to go for something like an endometrial ablation because of the previous history that she had of poor dental nerve pain.  That is not active anymore after she had a pole of the nerve release surgeries done in the past.  I asked her about the possibility of using oral contraceptive pills to regulate the menstrual flow.  She says that was brought up as a possibility several years ago but at that time she was hesitant because she did not want to put more hormones into her body and she was also worried about the risk of malignancy.    She says that many years ago she did take oral iron but it was difficult for her because of constipation.  She does not remember how many iron tablets that she could take in a day.  She remembers that the preparation was a Slow Fe.    No fevers.  No night sweats.  No lumps or bumps anywhere.  No vision changes.  No neurological symptoms otherwise.  No mouth sores.  No swallowing difficulty.  No nausea or  vomiting.  No cough.  No expectoration.  No real abdominal pain.  No blood in stool or black stools.  No complaints of diarrhea or constipation.  No dysuria.  No skin rashes.  No numbness or tingling.    Please see below.  A 14 point review of system is otherwise completely negative.    Past History  Past Medical History:   Diagnosis Date     Depression      Iron deficiency anemia due to chronic blood loss 2010    from Menorrhagia.  Treated with IV iron.     Past Surgical History:   Procedure Laterality Date     PUDENDAL NERVE DECOMPRESION Bilateral 03/31/2008    With bilateral fasciotomy of the [place]cock canal and fasciotomy of sacrao-spinous ligament.     Family History   Problem Relation Age of Onset     Depression Mother      Osteoporosis Mother      Prostate cancer Father 70     Other Brother         spinal stenosis     No Medical Problems Daughter      No Medical Problems Son      Hypertension Maternal Grandmother      Breast cancer Paternal Grandmother 65     Heart disease Paternal Grandfather         PAD     No Medical Problems Daughter      Other Brother         spinal stenosis     Social History     Socioeconomic History     Marital status:      Spouse name: None     Number of children: None     Years of education: None     Highest education level: None   Occupational History     Occupation: Hospicecare coordinator.     Employer: [employer]   Social Needs     Financial resource strain: None     Food insecurity:     Worry: None     Inability: None     Transportation needs:     Medical: None     Non-medical: None   Tobacco Use     Smoking status: Never Smoker     Smokeless tobacco: Never Used   Substance and Sexual Activity     Alcohol use: Yes     Alcohol/week: 3.0 standard drinks     Types: 3 Standard drinks or equivalent per week     Drug use: No     Sexual activity: None   Lifestyle     Physical activity:     Days per week: None     Minutes per session: None     Stress: None   Relationships      Social connections:     Talks on phone: None     Gets together: None     Attends [employer] service: None     Active member of club or organization: None     Attends meetings of clubs or organizations: None     Relationship status: None     Intimate partner violence:     Fear of current or ex partner: None     Emotionally abused: None     Physically abused: None     Forced sexual activity: None   Other Topics Concern     None   Social History Narrative     None     Allergies    No Known Allergies    Review of Systems    General  General (WDL): Exceptions to WDL  Fatigue: Yes - Chronic (Greater than 3 months)  Fever: None  Generalized Muscle Weakness: Yes - Recent (Less than 3 months)  Weight Loss: No  ENT  ENT (WDL): Exceptions to WDL  Vertigo (Dizziness): Yes, Recent (Less than 3 months)  Changes in vision: Yes - Recent (Less than 3 months)  Respiratory  Respiratory (WDL): Exceptions to WDL  Dyspnea: Yes - Recent (Less than 3 months)  Cardiovascular  Cardiovascular (WDL): Exceptions to WDL  Palpitations: Yes - Chronic (Greater than 3 months)  Irregular Heart Beat: Yes - Recent (Less than 3 months)  Chest Pain: Yes - Recent (Less than 3 months)  Lightheadedness: Yes - Recent (Less than 3 months)(syncopal episode)  Endocrine  Endocrine (WDL): Exceptions to WDL  Cold Intolerance: Yes - Chronic (Greater than 3 months)  Gastrointestinal  Gastrointestinal (WDL): All gastrointestinal elements are within defined limits  Musculoskeletal  Musculoskeletal (WDL): All musculoskeletal elements are within defined limits  Neurological  Neurological (WDL): Exceptions to WDL  History of LOC?: Yes - Recent (Less than 3 months)  Headaches: Yes - Recent (Less than 3 months)  Vertigo (Dizziness): Yes, Recent (Less than 3 months)  Dominant Hand: Left  Psychological/Emotional  Psychological/Emotional (WDL): Exceptions to WDL  Depression: Yes - Chronic (Greater than 3 months)  Insomnia: Yes - Recent (Less than 3 months)  Anxiety: Yes -  "Recent (Less than 3 months)  Daytime sleepiness: Yes - Recent (Less than 3 months)  Psychosocial needs or not coping well: Yes - Recent (Less than 3 months)  Hematological/Lymphatic  Hematological/Lymphatic (WDL): Exceptions to WDL  Bruising: Yes - Chronic (Greater than 3 months)  Dermatological  Dermatologic (WDL): Assessment not pertinent to visit  Hair Loss: Yes - Chronic (Greater than 3 months)  Genitourinary/Reproductive  Genitourinary/Reproductive (WDL): All genitourinary/reproductive elements are within defined limits  Reproductive (Females only)  Menstrual irritation or increase in discharge: No  Age at start of periods: 14  Last menstural cycle: 10/13/2019  Number of pregnancies: 3  Age at first pregnancy: 19  Patient Pregnant?: No  Is the patient trying to get pregnant?: No  Is the patient on birth control: No  Pain  Currently in Pain: No/denies    Physical Exam    Recent Vitals 10/23/2019   Height 5' 6.5\"   Weight 134 lbs 11 oz   BSA (m2) 1.69 m2   /77   Pulse 75   Temp 97.7   Temp src 1   SpO2 98   Some recent data might be hidden       GENERAL: Alert and oriented to time place and person. Seated comfortably. In no distress.  She looks well.  Tall and thin build.    HEAD: Atraumatic and normocephalic.    EYES: CYNDI, EOMI.  No pallor.  No icterus.    Oral cavity: no mucosal lesion or tonsillar enlargement.    NECK: supple. JVP normal.  No thyroid enlargement.    LYMPH NODES: No palpable, cervical, axillary or inguinal lymphadenopathy.    CHEST: clear to auscultation bilaterally.  Resonant to percussion throughout bilaterally.  Symmetrical breath movements bilaterally.    CVS: S1 and S2 are heard. Regular rate and rhythm.  No murmur or gallop or rub heard.  No peripheral edema.    ABDOMEN: Soft. Not tender. Not distended.  No palpable hepatomegaly or splenomegaly.  No other mass palpable.  Bowel sounds heard.    EXTREMITIES: Warm.    SKIN: no rash, or bruising or purpura.  Has a full head of " hair.      Lab Results    Office Visit on 10/03/2019   Component Date Value Ref Range Status     Ferritin 10/03/2019 7* 10 - 130 ng/mL Final     Iron 10/03/2019 155  42 - 175 ug/dL Final     Transferrin 10/03/2019 337  212 - 360 mg/dL Final     Transferrin Saturation, Calculated 10/03/2019 37  20 - 50 % Final     Transferrin IBC, Calculated 10/03/2019 421  313 - 563 ug/dL Final     Vitamin D, Total (25-Hydroxy) 10/03/2019 28.4* 30.0 - 80.0 ng/mL Final     WBC 10/03/2019 5.1  4.0 - 11.0 thou/uL Final     RBC 10/03/2019 4.68  3.80 - 5.40 mill/uL Final     Hemoglobin 10/03/2019 13.5  12.0 - 16.0 g/dL Final     Hematocrit 10/03/2019 39.1  35.0 - 47.0 % Final     MCV 10/03/2019 83  80 - 100 fL Final     MCH 10/03/2019 28.7  27.0 - 34.0 pg Final     MCHC 10/03/2019 34.4  32.0 - 36.0 g/dL Final     RDW 10/03/2019 11.7  11.0 - 14.5 % Final     Platelets 10/03/2019 250  140 - 440 thou/uL Final     MPV 10/03/2019 7.7  7.0 - 10.0 fL Final     Neutrophils % 10/03/2019 60  50 - 70 % Final     Lymphocytes % 10/03/2019 30  20 - 40 % Final     Monocytes % 10/03/2019 8  2 - 10 % Final     Eosinophils % 10/03/2019 2  0 - 6 % Final     Basophils % 10/03/2019 0  0 - 2 % Final     Neutrophils Absolute 10/03/2019 3.1  2.0 - 7.7 thou/uL Final     Lymphocytes Absolute 10/03/2019 1.5  0.8 - 4.4 thou/uL Final     Monocytes Absolute 10/03/2019 0.4  0.0 - 0.9 thou/uL Final     Eosinophils Absolute 10/03/2019 0.1  0.0 - 0.4 thou/uL Final     Basophils Absolute 10/03/2019 0.0  0.0 - 0.2 thou/uL Final         Imaging Results    No results found.      Signed by: Daylin Olivarez MD

## 2021-06-24 NOTE — TELEPHONE ENCOUNTER
Seen hematologist yesterday HE.    Lower left back achiness. Deep in muscle.  A month has been going on but last week more achy.    Today pain has not resolved. Urine results were reported back to patient as normal. No urinary pain.    Transferred to scheduling for an appointment.    Rosalina Klein, RN, [facility] Nurse Triage/Med Refills RN         Reason for Disposition    MILD pain (i.e., scale 1-3; does not interfere with normal activities) and present > 3 days    Protocols used: FLANK PAIN-A-OH

## 2021-06-24 NOTE — PROGRESS NOTES
"Assessment/Plan:    1. Abdominal discomfort in left flank  Unclear etiology of symptoms. Possibly constipation or muscular but concern for potential kidney etiology - will evaluate further with abd US. Discussed constipation management with: water, fiber, stool softener. No need to repeat UA today. Discussed no need for LFTs or lipase today given location on pain.   - US Abdomen Complete; Future      Follow up: pending test results, as needed    Katiana Rodriguez MD  [facility]    Subjective:    Patient ID: Paradise Narvaez is a 44 y.o. female is here today for back pain    Back pain  -Patient was seen on 12/3 by hematology and mention back pain, UA normal without evidence of infection or hematuria  -twinges of pain for probably 2-3 weeks intermittently - now 7-10 days of distracting fullness/ache deep in her left low back  -no injury   -feels like \"got punched in the kidney\"  -sometimes radiates up to shoulder blade and around to left rib cage  -no worsening with urination/stooling  -tried ice/heat  -feels unbalanced/heavy in this area   -no urinary frequency or dysuria  -no hx similar  -had kidney infection as a 10 year old but nothing else since  -constipated      Patient Active Problem List   Diagnosis     Anemia     Depression     Iron deficiency anemia due to chronic blood loss     Chiari I malformation (H)     Menorrhagia with regular cycle     Myalgia     Irritable bowel syndrome     Past Medical History:   Diagnosis Date     Depression      Iron deficiency anemia due to chronic blood loss 2010    from Menorrhagia.  Treated with IV iron.     Past Surgical History:   Procedure Laterality Date     PUDENDAL NERVE DECOMPRESION Bilateral 03/31/2008    With bilateral fasciotomy of the Alcock canal and fasciotomy of sacrao-spinous ligament.     Current Outpatient Medications on File Prior to Visit   Medication Sig Dispense Refill     CALCIUM-VITAMIN D3 ORAL Take 1,000 Int'l Units by mouth.       " ferrous sulfate (IRON) 325 (65 FE) MG tablet Take 1 tablet by mouth daily with breakfast.       LORazepam (ATIVAN) 0.5 MG tablet TK 1-2 TS PO  BID PRN FOR PANIC       tranexamic acid 650 mg Tab TK 2 TS PO TID  4     vortioxetine (TRINTELLIX) 5 mg Tab tablet Take 5 mg by mouth daily.       No current facility-administered medications on file prior to visit.      No Known Allergies  Social History     Socioeconomic History     Marital status:      Spouse name: Not on file     Number of children: Not on file     Years of education: Not on file     Highest education level: Not on file   Occupational History     Occupation: Hospicecare coordinator.     Employer: [employer]   Social Needs     Financial resource strain: Not on file     Food insecurity:     Worry: Not on file     Inability: Not on file     Transportation needs:     Medical: Not on file     Non-medical: Not on file   Tobacco Use     Smoking status: Never Smoker     Smokeless tobacco: Never Used   Substance and Sexual Activity     Alcohol use: Yes     Alcohol/week: 3.0 standard drinks     Types: 3 Standard drinks or equivalent per week     Drug use: No     Sexual activity: Not on file   Lifestyle     Physical activity:     Days per week: Not on file     Minutes per session: Not on file     Stress: Not on file   Relationships     Social connections:     Talks on phone: Not on file     Gets together: Not on file     Attends Congregation service: Not on file     Active member of club or organization: Not on file     Attends meetings of clubs or organizations: Not on file     Relationship status: Not on file     Intimate partner violence:     Fear of current or ex partner: Not on file     Emotionally abused: Not on file     Physically abused: Not on file     Forced sexual activity: Not on file   Other Topics Concern     Not on file   Social History Narrative     Not on file     Family History   Problem Relation Age of Onset     Depression Mother       Osteoporosis Mother      Prostate cancer Father 70     Other Brother         spinal stenosis     No Medical Problems Daughter      No Medical Problems Son      Hypertension Maternal Grandmother      Breast cancer Paternal Grandmother 65     Heart disease Paternal Grandfather         PAD     No Medical Problems Daughter      Other Brother         spinal stenosis     Review of systems is as stated in HPI, and the remainder of system review is otherwise negative.    Objective:      /60   Pulse 74   Wt 140 lb 7 oz (63.7 kg)   LMP 11/21/2019   BMI 22.33 kg/m      General appearance: awake, NAD  HEENT: atraumatic, normocephalic, no scleral icterus or injection, ears and nose grossly normal, moist mucous membranes  CV: RRR, no murmurs/rubs/gallops, normal S1 and S2  Lungs: CTAB, no wheezes or crackles, breathing comfortably on room air  Abd: active bowel sounds, soft, non-distended, minimally tender in suprapubic region and left abd  Back: CVA tenderness on left side  Extremities: no LE edema bilaterally, moving all extremities  Skin: no rashes or lesions  Neuro: alert, oriented x3, CNs grossly intact, no focal deficits appreciated  Psych: normal mood/affect/behavior, answering questions appropriately, linear thought process

## 2021-06-24 NOTE — ANESTHESIA POSTPROCEDURE EVALUATION
Patient: Paradise Narvaez  Procedure(s):  HYSTEROSCOPY, NOVASURE ABLATION  Anesthesia type: MAC    Patient location: Phase II Recovery  Last vitals:   Vitals Value Taken Time   BP 94/52 10/21/20 1400   Temp 36.1  C (97  F) 10/21/20 1332   Pulse 74 10/21/20 1420   Resp 16 10/21/20 1400   SpO2 99 % 10/21/20 1420   Vitals shown include unvalidated device data.  Post vital signs: stable  Level of consciousness: awake and responds to simple questions  Post-anesthesia pain: pain controlled  Post-anesthesia nausea and vomiting: no  Pulmonary: unassisted, return to baseline  Cardiovascular: stable and blood pressure at baseline  Hydration: adequate  Anesthetic events: no    QCDR Measures:  ASA# 11 - [place]ita-op Cardiac Arrest: ASA11B - Patient did NOT experience unanticipated cardiac arrest  ASA# 12 - [place]-op Mortality Rate: ASA12B - Patient did NOT die  ASA# 13 - PACU Re-Intubation Rate: ASA13B - Patient did NOT require a new airway mgmt  ASA# 10 - Composite Anes Safety: ASA10A - No serious adverse event    Additional Notes:

## 2021-06-24 NOTE — TELEPHONE ENCOUNTER
Who is calling:    [facility]    Reason for Call:    Caller states they received an referral for this patient and is unclear as to why?    CMA reviewed chart and is unable to locate the referral.    Please call to clarify if patient needs an appointment with them.    Date of last appointment with primary care: 12/06/2019    Okay to leave a detailed message: Yes

## 2021-06-24 NOTE — ANESTHESIA PREPROCEDURE EVALUATION
Anesthesia Evaluation      Patient summary reviewed     Airway   Mallampati: II  Neck ROM: full   Pulmonary - negative ROS and normal exam   (-) not a smoker                         Cardiovascular   Exercise tolerance: > or = 4 METS  Rhythm: regular  Rate: normal,      ROS comment: Anemia     Neuro/Psych - negative ROS   (+) depression,     Endo/Other    (-) not pregnant     GI/Hepatic/Renal            Dental - normal exam                        Anesthesia Plan  Planned anesthetic: MAC    ASA 2     Anesthetic plan and risks discussed with: patient and spouse  Anesthesia plan special considerations: antiemetics,   Post-op plan: routine recovery

## 2021-06-24 NOTE — PROGRESS NOTES
Assessment/Plan:     1. Vasovagal episode     2. Need for immunization against influenza  Influenza,Seasonal,Quad,INJ =/>6months   3. Need for Td vaccine  Td, Preservative Free (green label)   4. Vitamin D deficiency  Vitamin D, Total (25-Hydroxy)   5. Iron deficiency  HM1(CBC and Differential)    Ferritin    Iron and Transferrin Iron Binding Capacity    HM1 (CBC with Diff)   6. Chiari I malformation (H)         Diagnoses and all orders for this visit:    Vasovagal episode  Her symptoms are consistent with a vasovagal episode.  She is neurologically intact and cardiovascular exam is normal.  She has not had recurrence of symptoms.  Discussed etiology of this condition.  At this time encouraged healthy diet, regular exercise, adequate fluids and rest.  If recurrence of symptoms, would consider further work-up such as echocardiogram or Stephanie hookup    Need for immunization against influenza  -     Influenza,Seasonal,Quad,INJ =/>6months  -Counseled on vaccine and side effects    Need for Td vaccine  -     Td, Preservative Free (green label)  -Counseled on vaccine and side effects    Vitamin D deficiency  -     Vitamin D, Total (25-Hydroxy)  -Noted low vitamin D level with labs done last year.  Recheck today    Iron deficiency  -     HM1(CBC and Differential)  -     Ferritin  -     Iron and Transferrin Iron Binding Capacity  -     HM1 (CBC with Diff)  -We will check iron levels, hemogram and ferritin given history of iron deficiency and presyncopal episode    Chiari malformation  I do not feel that recent presyncopal episode was related to this but I did encourage her to call her neurologist and notify him of this recent event           Subjective:      Paradise Narvaez is a 44 y.o. female who comes in today with concern about a fainting spell that occurred on September 23, 2019.  She has not been seen since 2016.  Reviewed her health history.  She has history of iron deficiency anemia and does not absorb iron  supplements.  She has required iron infusions in the past.  Last infusion was in 2016.  She does have history of heavy menstrual cycles.  She is followed by her gynecologist and takes tranexamic acid with her periods to help reduce the bleeding.  We reviewed and updated her medications and allergies.  She reports that on the day of the fainting episode she was feeling fine and in her usual state of health.  She went to [facility] and was sitting with a friend.  She had drank about half of her drink and then started to feel quite nauseated.  Her friend helped her to the bathroom and she recalls that she felt very woozy and dizzy and then she recalls sitting on the floor of the bathroom.  She did not vomit.  She is not sure that she actually fainted but may have just felt like she was going to.  She was very hot and sweaty and then she felt very cold and tired.  She went home and slept for about 10 hours.  States that she felt fine the next day.  She has never had anything happen like this to her in the past.  She reports no diarrhea.  She has not had any urinary symptoms or other symptoms of infection.  She does not think that she is pregnant as she just finished her period and her  has had a vasectomy and they also have not had sex recently.  She reports that she was diagnosed with Chiari malformation this past summer.  She had an MRI done due to symptoms of cervical radiculopathy and low-lying cerebellar tonsils were noted.  She did see a neurologist for this.  She was advised that no treatment was needed and follow-up in 1 year or sooner if she started experiencing headaches.  She is not sure if this could have caused her symptoms.  She is concerned that her iron levels  may be low and would like those checked today.  She has no other concerns or questions.  Review of systems otherwise negative.    Current Outpatient Medications   Medication Sig Dispense Refill     CALCIUM-VITAMIN D3 ORAL Take 1,000 Int'l  Units by mouth.       sertraline (ZOLOFT) 100 MG tablet Take 100 mg by mouth daily.       tranexamic acid 650 mg Tab TK 2 TS PO TID  4     ferrous sulfate (IRON) 325 (65 FE) MG tablet Take 1 tablet by mouth daily with breakfast.       LORazepam (ATIVAN) 0.5 MG tablet TK 1-2 TS PO  BID PRN FOR PANIC       No current facility-administered medications for this visit.        Past Medical History, Family History, and Social History reviewed.  No past medical history on file.  Past Surgical History:   Procedure Laterality Date     PA DESTRUCT BY NEURO AGENT; PUDENDAL      Description: Nerve Ablation Pudendal Nerve;  Recorded: 06/24/2009;     Patient has no known allergies.  Family History   Problem Relation Age of Onset     Depression Mother      No Medical Problems Father      No Medical Problems Brother      No Medical Problems Daughter      No Medical Problems Son      Hypertension Maternal Grandmother      Breast cancer Paternal Grandmother      Heart disease Paternal Grandfather         PAD     No Medical Problems Daughter      No Medical Problems Brother      Social History     Socioeconomic History     Marital status:      Spouse name: Not on file     Number of children: Not on file     Years of education: Not on file     Highest education level: Not on file   Occupational History     Not on file   Social Needs     Financial resource strain: Not on file     Food insecurity:     Worry: Not on file     Inability: Not on file     Transportation needs:     Medical: Not on file     Non-medical: Not on file   Tobacco Use     Smoking status: Never Smoker     Smokeless tobacco: Never Used   Substance and Sexual Activity     Alcohol use: Yes     Alcohol/week: 3.0 standard drinks     Types: 3 Standard drinks or equivalent per week     Drug use: No     Sexual activity: Not on file   Lifestyle     Physical activity:     Days per week: Not on file     Minutes per session: Not on file     Stress: Not on file  "  Relationships     Social connections:     Talks on phone: Not on file     Gets together: Not on file     Attends Zoroastrian service: Not on file     Active member of club or organization: Not on file     Attends meetings of clubs or organizations: Not on file     Relationship status: Not on file     Intimate partner violence:     Fear of current or ex partner: Not on file     Emotionally abused: Not on file     Physically abused: Not on file     Forced sexual activity: Not on file   Other Topics Concern     Not on file   Social History Narrative     Not on file         Review of systems is as stated in HPI, and the remainder of the 10 system review is otherwise negative.    Objective:     Vitals:    10/03/19 0828   BP: 112/60   Patient Site: Right Arm   Patient Position: Sitting   Cuff Size: Adult Regular   Pulse: 69   Temp: 97.5  F (36.4  C)   TempSrc: Oral   SpO2: 99%   Weight: 137 lb 1 oz (62.2 kg)   Height: 5' 6.5\" (1.689 m)    Body mass index is 21.79 kg/m .    General appearance: alert, appears stated age and cooperative  Head: Normocephalic, without obvious abnormality, atraumatic  Eyes: conjunctivae/corneas clear. PERRL, EOM's intact. Fundi benign.  Ears: normal TM's and external ear canals both ears  Nose: Nares normal. Septum midline. Mucosa normal. No drainage or sinus tenderness.  Throat: lips, mucosa, and tongue normal; teeth and gums normal  Neck: no adenopathy  Lungs: clear to auscultation bilaterally  Heart: regular rate and rhythm, S1, S2 normal, no murmur, click, rub or gallop  Extremities: extremities normal, atraumatic, no cyanosis or edema  Neurologic: Grossly normal    Recent Results (from the past 24 hour(s))   HM1 (CBC with Diff)    Collection Time: 10/03/19  9:00 AM   Result Value Ref Range    WBC 5.1 4.0 - 11.0 thou/uL    RBC 4.68 3.80 - 5.40 mill/uL    Hemoglobin 13.5 12.0 - 16.0 g/dL    Hematocrit 39.1 35.0 - 47.0 %    MCV 83 80 - 100 fL    MCH 28.7 27.0 - 34.0 pg    MCHC 34.4 32.0 - " 36.0 g/dL    RDW 11.7 11.0 - 14.5 %    Platelets 250 140 - 440 thou/uL    MPV 7.7 7.0 - 10.0 fL    Neutrophils % 60 50 - 70 %    Lymphocytes % 30 20 - 40 %    Monocytes % 8 2 - 10 %    Eosinophils % 2 0 - 6 %    Basophils % 0 0 - 2 %    Neutrophils Absolute 3.1 2.0 - 7.7 thou/uL    Lymphocytes Absolute 1.5 0.8 - 4.4 thou/uL    Monocytes Absolute 0.4 0.0 - 0.9 thou/uL    Eosinophils Absolute 0.1 0.0 - 0.4 thou/uL    Basophils Absolute 0.0 0.0 - 0.2 thou/uL         This note has been dictated using voice recognition software. Any grammatical or context distortions are unintentional and inherent to the the software.

## 2021-06-24 NOTE — PATIENT INSTRUCTIONS - HE
Constipation:  -lots of water  -increase dietary fiber or fiber supplement (powder, benefiber)  -stool softener: docusate or miralax and then if not working or significant symptoms then do senna product

## 2021-06-24 NOTE — PROGRESS NOTES
[facility]  [address]  [facility] [address]  Dept: [phone]  Dept Fax: [phone]  Primary Provider: Zuly Gardner MD  Pre-op Performing Provider: ABHILASH GARRIDO    PREOPERATIVE EVALUATION:  Today's date: 10/14/2020    Paradise Narvaez is a 45 y.o. female who presents for a preoperative evaluation.    Surgical Information:  Surgery Details 10/11/2020   Surgery/Procedure: Uterine ablation   Surgery Location: [facility]   Surgeon: Dr. Alvarenga   Surgery Date: 10/21/20   Time of Surgery: 12:30   Where patient plans to recover: at home with family     Fax number for surgical facility: Note does not need to be faxed, will be available electronically in Epic.  10/21/2020 DR Alvarenga, Hysteroscopy, Novasure Ablation. [facility]   Type of Anesthesia Anticipated: MAC    Subjective     HPI related to upcoming procedure: did get iron infusion through different facility last week - done at [facility] - will go back in 8-10 weeks for anil. Heavy periods since 14 and anemia for past 10 years so excited to get this done and hopefully be done with it. Bleeding has been the same since last blood draw - using tranexamic acid which helps.    Preop Questions 10/11/2020   Have you ever had a heart attack or stroke? No   Have you ever had surgery on your heart or blood vessels, such as a stent placement, a coronary artery bypass, or surgery on an artery in your head, neck, heart, or legs? No   Do you have chest pain with activity? No   Do you have a history of  heart failure? No   Do you currently have a cold, bronchitis or symptoms of other infection? No   Do you have a cough, shortness of breath, or wheezing? No   Do you or anyone in your family have previous history of blood clots? No   Do you or does anyone in your family have a serious bleeding problem such as prolonged bleeding following surgeries or cuts? No   Have you ever had problems with anemia or been told to take  iron pills? YES - from uterine bleeding   Have you had any abnormal blood loss such as black, tarry or bloody stools, or abnormal vaginal bleeding? No   Have you ever had a blood transfusion? YES - d/t bleeding   Have you ever had a transfusion reaction? No   Are you willing to have a blood transfusion if it is medically needed before, during, or after your surgery? Yes   Have you or any of your relatives ever had problems with anesthesia? No   Do you have sleep apnea, excessive snoring or daytime drowsiness? No   Do you have any artifical heart valves or other implanted medical devices like a pacemaker, defibrillator, or continuous glucose monitor? No   Do you have artificial joints? No   Are you allergic to latex? No   Is there any chance that you may be pregnant? No       Patient does not have a Health Care Directive or Living Will: discussed, full code    [place] ([place] reviewed:  reviewed - no concerns      Review of Systems  Constitutional, neuro, ENT, endocrine, pulmonary, cardiac, gastrointestinal, genitourinary, musculoskeletal, integument and psychiatric systems are negative, except as otherwise noted.    Patient Active Problem List    Diagnosis Date Noted     Menorrhagia with regular cycle 10/23/2019     Chiari I malformation (H) 10/03/2019     Iron deficiency anemia due to chronic blood loss 07/22/2016     Anemia      Depression      Myalgia 10/07/2008     Irritable bowel syndrome 10/07/2008     Past Medical History:   Diagnosis Date     Depression      Iron deficiency anemia due to chronic blood loss 2010    from Menorrhagia.  Treated with IV iron.     Past Surgical History:   Procedure Laterality Date     PUDENDAL NERVE DECOMPRESION Bilateral 03/31/2008    With bilateral fasciotomy of the Alcock canal and fasciotomy of sacrao-spinous ligament.     Current Outpatient Medications   Medication Sig Dispense Refill     CALCIUM-VITAMIN D3 ORAL Take 1,000 Int'l Units by mouth.       ferrous  "sulfate (IRON) 325 (65 FE) MG tablet Take 1 tablet by mouth daily with breakfast.       tranexamic acid 650 mg Tab TK 2 TS PO TID  4     vilazodone (VIIBRYD) 40 mg Tab tablet        LORazepam (ATIVAN) 0.5 MG tablet TK 1-2 TS PO  BID PRN FOR PANIC       No current facility-administered medications for this visit.        No Known Allergies    Social History     Tobacco Use     Smoking status: Never Smoker     Smokeless tobacco: Never Used   Substance Use Topics     Alcohol use: Yes     Alcohol/week: 3.0 standard drinks     Types: 3 Standard drinks or equivalent per week      Family History   Problem Relation Age of Onset     Depression Mother      Osteoporosis Mother      Prostate cancer Father 70     Other Brother         spinal stenosis     No Medical Problems Daughter      No Medical Problems Son      Hypertension Maternal Grandmother      Breast cancer Paternal Grandmother 65     Heart disease Paternal Grandfather         PAD     No Medical Problems Daughter      Other Brother         spinal stenosis     Social History     Substance and Sexual Activity   Drug Use No           Objective   /60 (Patient Site: Left Arm, Patient Position: Sitting, Cuff Size: Adult Regular)   Pulse 80   Temp 97.1  F (36.2  C) (Tympanic)   Ht 5' 6.5\" (1.689 m)   Wt 129 lb 8 oz (58.7 kg)   LMP 09/21/2020   SpO2 94%   Breastfeeding No   BMI 20.59 kg/m    Physical Exam    GENERAL APPEARANCE: healthy, alert and no distress     EYES: EOMI, PERRL     HENT: ear canals and TM's normal and nose normal     NECK: no adenopathy, no asymmetry, masses, or scars and thyroid normal to palpation     RESP: lungs clear to auscultation - no rales, rhonchi or wheezes     CV: regular rates and rhythm, normal S1 S2, no S3 or S4 and no murmur, click or rub     ABDOMEN:  soft, nontender, no HSM or masses and bowel sounds normal     MS: extremities normal- no gross deformities noted, no evidence of inflammation in joints, FROM in all extremities.    "  SKIN: no suspicious lesions or rashes     NEURO: Normal strength and tone, sensory exam grossly normal, mentation intact and speech normal     PSYCH: mentation appears normal. and affect normal/bright     LYMPHATICS: No cervical adenopathy    Recent Labs   Lab Test 08/28/20  0751 12/13/19  0925 11/26/19  0841   HGB 12.6  --  12.5     --  255   NA  --  138  --    K  --  4.3  --    CREATININE  --  0.81  --         Assessment & Plan       The proposed surgical procedure is considered INTERMEDIATE risk.    REVISED CARDIAC RISK INDEX   The patient has the following serious cardiovascular risks for perioperative complications:  No serious cardiac risks = 0 points    INTERPRETATION: 0 points: Class I (very low risk - 0.4% complication rate)    1. Preop examination  2. Abnormal uterine bleeding  Preop exam completed today for abnormal uterine bleeding, resulting in iron deficiency anemia and requiring iron infusions and blood transfusions to manage.  Patient otherwise medically healthy.  No indication for EKG or chest x-ray today.  Patient had normal hemoglobin level on 8/28/2020, she reports no significant bleeding since that time, has been using tranexamic acid to manage bleeding and received iron transfusion last week; with all this in mind, we will not recheck hemoglobin level today.  No other blood work indicated today.  Medication plan as below.      The patient has the following additional risks and recommendations for perioperative complications:     - No identified additional risk factors other than previously addressed     MEDICATION INSTRUCTIONS:  hold all medications on morning of procedure/surgery and then take after surgery is done  -would be ok to take antidepressant medication on morning of surgery with sip of water if desired    RECOMMENDATION:  APPROVAL GIVEN to proceed with proposed procedure, without further diagnostic evaluation.    Return in about 1 year (around 10/14/2021) for Annual  physical.    Signed Electronically by: Katiana Rodriguez MD

## 2021-06-24 NOTE — PATIENT INSTRUCTIONS - HE
Patient Instructions by Zuly Gardner MD at 10/3/2019  8:20 AM     Author: Zuly Gardner MD Service: -- Author Type: Physician    Filed: 10/3/2019  8:50 AM Encounter Date: 10/3/2019 Status: Addendum    : Zuly Gardner MD (Physician)    Related Notes: Original Note by Zuly Gardner MD (Physician) filed at 10/3/2019  8:50 AM         Patient Education     Understanding Vasovagal Syncope  Vasovagal syncope is fainting caused by a complex nerve and blood vessel reaction in the body. It's the result of an abnormal reflex in the body and is often called reflex syncope. Its the most common cause of fainting. Unlike other causes of fainting, its not a sign of a problem with the heart or brain  How to say it  [id number]  [id number]-o-pee  How vasovagal syncope happens  Many nerves connect with your heart and blood vessels. These nerves help control the speed and force of your heartbeat. They also regulate blood pressure. They control whether your blood vessels should be more open or more closed.  Usually these nerves work together so you always get enough blood to your brain. In certain cases, these nerves may give a wrong signal or be slow to respond to input received from the body. This may cause your blood vessels to open wide or cause them not to get more narrow when they need to. At the same time, your heartbeat may slow down. Blood can start to pool in your legs, and not enough of it may reach the brain. If that happens, you may briefly lose consciousness. When you lie down or fall down, blood flow to the brain resumes.  What causes vasovagal syncope?  Many triggers can cause vasovagal syncope, such as:    Standing for long periods    Too much heat    Intense emotion, such as fear    Intense pain    The sight of blood or a needle    Exercising for a long time  Older adults may have additional triggers, such as:    Urinating    Swallowing    Coughing    Having a bowel movement  Symptoms of vasovagal  syncope  Fainting is the main symptom of vasovagal syncope. You may have symptoms before fainting such as:    Nausea    Warm, flushed feeling    Face that turns pale    Sweaty palms    Feeling dizzy    Blurred vision  If you lie down and elevate your legs at the first sign of these symptoms, you will often be able to prevent fainting. Not everyone notices symptoms before fainting, however.  When a person does faint, lying down restores blood flow to the brain. Consciousness should return fairly quickly. You might not feel normal for a little while after you faint. You might feel depressed or fatigued for a short time. You may even feel nauseous afterwards and vomit.  Some people have only 1 or 2 episodes of vasovagal syncope in their life. For others, it happens more often and with no warning.

## 2021-06-24 NOTE — PATIENT INSTRUCTIONS - HE
1.  Please start taking the oral iron pills.  Start with 1 tablet a day taken with food.  After a week if you feel that you are tolerating it well, then you can increase it to 2 times a day.  After another week if you feel you are tolerating it well, then we can consider increasing it to 3 times a day.    2.  If you feel constipated with the iron pills, you can take any over-the-counter laxative.    3.  Please follow-up with your OB/GYN to have the menorrhagia treated.    4.  Please see below for a list of iron-containing foods.  Try to incorporate them into your diet.          [email or web address].[employer]/  2014 UpToDate     2014 UpToDate    Dietary sources of iron   Food  Approximate measure  Iron (mg)    High iron sources    Cream of Wheat (quick or instant)*  1/2 cup 7.8   Kidney, beef  2 oz (60 g) 5.3   Liver, beef  2 oz (60 g) 5.8   Liver, calf  2 oz (60 g) 9.0   Liver, chicken  2 oz (60 g) 6.0   Liverwurst  2 oz (60 g) 3.6   Prune juice 1/2 cup 5.1   Spinach  1/2 cup 3.2   Moderate iron sources    All-Bran cereal  1/2 cup 2.9   Almonds, dried unblanched  1/2 cup 3.0   Dried beans and peas    Baked beans, no pork  1/4 cup  1.5    Blackeye peas, cooked  1/4 cup  0.8    Chick peas, dry  1/4 cup  3.5    Great northern beans, cooked  1/4 cup  1.3    Green peas, cooked  1/4 cup  1.4    Lentils, dry  1/4 cup  3.4    Lima beans, cooked  1/4 cup  1.3    Navy beans, cooked  1/4 cup  1.3    Red beans, dry  1/4 cup  3.5    Soybeans, cooked  1/4 cup  1.4    White beans, dry  1/4 cup  3.9    Beef, cooked  2 oz (60 g) 2-3?   Ham, cooked  2 oz (60 g) 1.3   Ross, cooked  2 oz (60 g) 1.9   Peaches, dried  1/4 cup 2.4   Peanuts, roasted without skins  3 1/2 oz (100 g) 3.2   Pork, cooked  2 oz (60 g) 2-3?   Prunes, dried  2 large 1.1   Scallops  2 oz (60 g) 1.6   Turkey, cooked  2 oz (60 g) 1.7   Approximate iron content of children's favorite foods    Hamburger, small  1  3.0    Large  1  5.2    Big Mac  1  4.3    Quarter Pounder  1   5.1    Spaghetti with meatballs  1 cup 3.3   Frankfurter and beans  1 cup 4.8   Pork and beans  1 cup 5.9   Raisins  5/8 cup 3.5   Cereals, fortified  1 serving 4.5-17.8   Nuts  1 cup 5.0-7.0   Seeds, sunflower  3 1/2 oz (100 g) 7.1   Chile con carne 1 cup 3.6   Beef burrito or charles  1 medium 3.4-4.6   Cheese pizza  2 slices 3.0   Cheese pizza with beef  2 slices 4.8   White bread  1 piece 0.7   * Or other fortified cereals which contain 10 mg of iron per ounce or 100 percent RDA per serving.    As organ meats are generally high in cholesterol, these iron-rich foods should be eaten in moderation.  ? Depending on cut, the greatest amounts of iron are generally found in the mojgan, flank, and bottom round cuts of beef.  ? Depending on cut, the greatest amounts of iron are generally found in the loin, sirloin, tenderloin, and picnic shoulder cuts of pork.    Raisins, nuts, and seeds are not generally recommended for children under age three because of risk of choking.   Data from: Bucky COLEMAN, Cornelia THOMAS (Eds), Nutrition in Pediatrics, 2nd ed, [employer] [facility], [place] 1997.   Graphic 56096 Version 1.0

## 2021-06-24 NOTE — PROGRESS NOTES
[facility] Progress Note    Patient: Paradise Narvaez  MRN: [id number]  Date of Service: 12/03/2019        Reason for Visit    Follow-up of iron deficiency anemia due to chronic blood loss.    Assessment and Plan      ECOG Performance   ECOG Performance Status: 0    Distress Assessment  Distress Assessment Score: 2: Please see the discussion below.    Pain  Pain Score (Initial OR Reassessment): 4: Please see the discussion below.    Ms. Paradise Narvaez is a 44 y.o. woman with iron deficiency anemia.  This is a long-standing issue.  She did receive IV Venofer for the first time in 2010 and with that her blood counts came up nicely and she felt very good.  She says that she has received IV iron with a couple of times since then.  The last episode was at least a couple of years ago.  She has been feeling more fatigued recently and she had an episode of vasovagal syncope about 3 weeks ago.  When she saw Dr. Gardner she had her blood counts checked.  Hemoglobin was normal at 13.5.  She has not yet microcytic.  However her iron panel and ferritin showed that she has become iron deficient.  She has had an upper GI endoscopy and work-up for celiac disease done in 2010 which came back negative.    The blood work that she had done on 10/23/2019, she was found not to be anemic but she did have iron deficiency based on the culture and TIBC panel.  Vitamin B12 level was towards the lower half of normal.    1.  She is here for follow-up after about 6 weeks.  She has not been taking the antibiotics but she has been taking prenatal vitamin daily.  Think this will be giving her about asthma iron as 1 tablet of iron supplement along with the B complex vitamins.  When we look at her labs from 11/26/2019, we can see that her blood counts are completely normal with a hemoglobin of 12.5.  MCV is normal.    The and panel and ferritin show a clear improvement in her iron levels.  The TIBC panel shows that the iron  saturation and serum iron are both within normal limits now and have clearly improved.  The ferritin has also improved to 8 ng/mL.  That is still below normal but there is clear improvement.  Overall even with just 1 prenatal vitamin tablet a day she is improving.    I discussed with her that, I really cannot justify giving her IV iron with these findings.  The iron levels are improving even with just 1 prenatal vitamin a day and some improvement in her diet.  I think she can try to continue with the same and I do not an additional iron supplement tablet at another time of the day with food.  I think she will be able to tolerate that well.  That is all that she will need for the iron deficiency.  She should have close see what can be done to control the menorrhagia.    I discussed with her that if her mood and fatigue is not improving with the improvement in the iron levels, then she has to consider whether there is something else that is making her feel fatigued.  I advised her to follow-up with her primary physician about that.    2.  She complains of a left paraspinal discomfort/pain.  Nothing is clearly evident to me on physical examination.  She says that the pain is mild.  We agreed that we will check a urinalysis today to see whether there is any evidence of a urinary infection.  I will call her with the results.    Otherwise I advised conservative management for the time being.  Advised her to try to remain active as before since patients who take to the bed with back pain have a worse outcome.  She can use cold or heat to the area as she prefers for the pain.  She can also use over-the-counter analgesics like acetaminophen or even ibuprofen for the pain.  However if the pain persists, then she should follow-up with her primary care clinic for further work-up.  She voiced understanding.    3.  At this point I do not think she needs any ongoing hematology follow-up.  She should continue her follow-up with the  primary care clinic.    Time spend >25 minutes face to face with the patient. More than 50 % in counseling and coordination of care.      Problem List    1. Iron deficiency anemia due to chronic blood loss     2. Fatigue, unspecified type     3. Pain in left paraspinal region  Urinalysis-UC if Indicated        CC: Maru Narvaez N, CNP    ______________________________________________________________________________    History of Present Illness    Ms. Paradise Narvaez is here for follow-up alone.    She says that she has been taking a prenatal vitamin daily.  She has not been taking an iron tablet.  She says that she started a new medication for her depression and that was causing constipation.  She do not want to add on an iron tablet.    Follows with her mood and fatigue continue.  She has not perceived any improvement.    She also wants me to check out her back.  She says that for the last 2 to 3 weeks she has been having some discomfort in the left paraspinal back area that is radiating along the abdominal wall on the left side.    She has had no fever.  She has had no change in her bowel habits.  She has not noticed any dysuria or frequency.  She has not suffered any trauma.  She does not think that she has strained her back.    Please see below.  A 14 point review of system is otherwise completely negative.        Pain Status  Currently in Pain: Yes    Review of Systems    Constitutional  Constitutional (WDL): Exceptions to WDL  Fatigue: Fatigue relieved by rest  Fever: None  Chills: None  Weight Gain: 5 - <10% from baseline(5#)  Weight Loss: None  Neurosensory  Neurosensory (WDL): All neurosensory elements are within defined limits  Cardiovascular  Cardiovascular (WDL): Exceptions to WDL  Palpitations: Definition: A disorder characterized by inflammation of the muscle tissue of the heart.  Edema: No  Phlebitis: None  Superficial thrombophlebitis: None  Pulmonary  Respiratory (WDL): Exceptions to WDL  Cough:  None  Dyspnea: Shortness of breath with moderate exertion(occ difficut to take deeper breath due to Lt side full feeling)  Hypoxia: None  Gastrointestinal  Gastrointestinal (WDL): Exceptions to WDL  Anorexia: None  Constipation: Occasional or intermittent symptoms, occasional use of stool softeners, laxatives, dietary modification, or enema  Diarrhea: None  Dysphagia: None  Esophagitis: None  Nausea: Loss of appetite without alteration in eating habits(30 minutes after trintellix)  Pharyngitis: None  Vomiting: None  Dysgeusia: None  Dry Mouth: None  Genitourinary  Genitourinary (WDL): All genitourinary elements are within defined limits  Integumentary  Integumentary (WDL): All integumentary elements are within defined limits  Patient Coping  Patient Coping: Open/discussion  Distress Assessment  Distress Assessment Score: 2  Accompanied by  Accompanied by: Alone    Past History  Past Medical History:   Diagnosis Date     Depression      Iron deficiency anemia due to chronic blood loss 2010    from Menorrhagia.  Treated with IV iron.         Past Surgical History:   Procedure Laterality Date     PUDENDAL NERVE DECOMPRESION Bilateral 03/31/2008    With bilateral fasciotomy of the Alcock canal and fasciotomy of sacrao-spinous ligament.       Physical Exam    Recent Vitals 12/3/2019   Height -   Weight 143 lbs 13 oz   BSA (m2) 1.75 m2   /74   Pulse 86   Temp 97.5   Temp src 1   SpO2 97   Some recent data might be hidden       GENERAL: Alert and oriented to time place and person. Seated comfortably. In no distress.  She looks well overall.  A bit anxious.    HEAD: Atraumatic and normocephalic.    EYES: CYNDI, EOMI.  No pallor.  No icterus.    Oral cavity: no mucosal lesion or tonsillar enlargement.    NECK: supple. JVP normal.  No thyroid enlargement.    LYMPH NODES: No palpable, cervical, axillary or inguinal lymphadenopathy.    ABDOMEN: Soft. Not tender. Not distended.  No palpable hepatomegaly or  splenomegaly.  No other mass palpable.  Bowel sounds heard.    SPINE: Tenderness.  No deformity.  No step-off.  No fullness of the left paraspinal area.  No tenderness that I could actually elicit.    EXTREMITIES: Warm.    SKIN: no rash, or bruising or purpura.  Has a full head of hair.      Lab Results    No results found for this or any previous visit (from the past 168 hour(s)).    Imaging    No results found.      Signed by: Daylin Olivarez MD

## 2021-06-24 NOTE — PROGRESS NOTES
I called the patient on her cell phone and informed her that the urine test from yesterday did not show evidence of urinary infection.  She said that she will follow-up with her primary.      Daylin Olivarez MD

## 2021-06-24 NOTE — TELEPHONE ENCOUNTER
New Appointment Needed  What is the reason for the visit:    Pre-Op Appt Request  When is the surgery? :  10.21.2020  Where is the surgery?:   [facility]  Who is the surgeon? :  Dr. Alvarenga  What type of surgery is being done?: uterine ablation  Provider Preference: Dr. Gardner or Dr. Rodriguez  How soon do you need to be seen?: before the 21st  Waitlist offered?: No  Okay to leave a detailed message:  Yes

## 2021-06-24 NOTE — PROGRESS NOTES
Assessment:   1. Chronic fatigue  Differentials including anemia, depression, and thyroid disorder.  Patient has had low iron in the past we will check ferritin and replaced by iron infusion if needed.  - HM1 (CBC and Differential): Normal  - Ferritin  - Thyroid Stimulating Hormone (TSH)  - Vitamin D, Total (25-Hydroxy)  - HM1 (CBC with Diff)    Plan:   Discussed diagnosis with patient.  Discussed lifestyle modification as means of resolving problem.  Discussed how depression can be a cause of fatigue.   Advised ferrous sulfate supplements, 325 mg/day  Follow up in as needed    Subjective:   Paradise Narvaez is a 43 y.o. female who presents for evaluation of fatigue. Symptoms began several weeks ago. The patient feels the fatigue began with: excessive menstrual bleeding and exercise intolerance. Symptoms of her fatigue have been feelings of depression and lack of interest in usual activities. Patient describes the following psychological symptoms: depression. Patient denies change in hair texture, cold intolerance, constipation, fever, GI blood loss, unusual rashes and witnessed or suspected sleep apnea. Symptoms have gradually worsened. Symptom severity: symptoms bothersome, but easily able to carry out all usual work/school/family activities. Previous visits for this problem: multiple, this is a longstanding diagnosis which is due to her amimia from excessive blood loss from her monthly period. In the past patient has had to receive iron infusion because she does not tolerate the iron pile. Patient has no other concerns.     The following portions of the patient's history were reviewed and updated as appropriate: allergies, current medications, past family history, past medical history, past social history, past surgical history and problem list.    Review of Systems  Pertinent items are noted in HPI.      Objective:   BP 94/66 (Patient Site: Right Arm, Patient Position: Sitting, Cuff Size: Adult Regular)   "Pulse 76  Ht 5' 6.5\" (1.689 m)  Wt 144 lb (65.3 kg)  SpO2 97%  BMI 22.89 kg/m2  General appearance: alert, appears stated age and cooperative  Head: Normocephalic, without obvious abnormality, atraumatic  Lungs: clear to auscultation bilaterally  Heart: regular rate and rhythm, S1, S2 normal, no murmur, click, rub or gallop  Pulses: 2+ and symmetric  Skin: Skin color, texture, turgor normal. No rashes or lesions  Lymph nodes: Cervical, supraclavicular, and axillary nodes normal.  Neurologic: Grossly normal          "

## 2021-06-24 NOTE — TELEPHONE ENCOUNTER
Attempted to call the number provided and the number is not in service therefore unable to return Maxim's phone call. Per patients [employer] message her OBGYN wants her to have a second opinion regarding her low ferritin therefore she is going to see a hematologist at [facility]. Unclear whether her OBGYN placed an order for her to meet with [facility] or not.

## 2021-06-24 NOTE — PATIENT INSTRUCTIONS - HE
1.  Please continue the prenatal vitamin a tablet daily.  Try to take at least 1 iron tablet with food along with that.    2.  Try to continue the iron rich foods in your diet.    3.  I will call you with the results of the urinalysis.    4.  If the pain in the left side back persists, please follow-up with your primary doctor.

## 2021-06-24 NOTE — ANESTHESIA CARE TRANSFER NOTE
Last vitals:   Vitals:    10/21/20 1332   BP: 104/58   Pulse: 76   Resp: 16   Temp: 36.1  C (97  F)   SpO2: 98%     Patient's level of consciousness is drowsy  Spontaneous respirations: yes  Maintains airway independently: yes  Dentition unchanged: yes  Oropharynx: oropharynx clear of all foreign objects    QCDR Measures:  ASA# 20 - Surgical Safety Checklist: WHO surgical safety checklist completed prior to induction    PQRS# 430 - Adult PONV Prevention: 4558F - Pt received => 2 anti-emetic agents (different classes) preop & intraop  ASA# 8 - Peds PONV Prevention: NA - Not pediatric patient, not GA or 2 or more risk factors NOT present  PQRS# 424 - [place]-op Temp Management: 4559F - At least one body temp DOCUMENTED => 35.5C or 95.9F within required timeframe  PQRS# 426 - PACU Transfer Protocol: - Transfer of care checklist used  ASA# 14 - Acute Post-op Pain: ASA14B - Patient did NOT experience pain >= 7 out of 10

## 2021-06-24 NOTE — PATIENT INSTRUCTIONS - HE
"hold all medications on morning of procedure/surgery and then take after surgery is done  -would be ok to take antidepressant medication on morning of surgery with sip of water if desired        Preparing for Your Surgery  Getting started  A surgery nurse will call you to review your health history and instructions. They will give you an arrival time based on your scheduled surgery time.  Please be ready to share the following:    Your doctor's clinic name and phone number    Your medical, surgical and anesthesia history    A list of allergies and sensitivities    A list of medicines, including herbal treatments and over-the-counter drugs    Whether the patient has a legal guardian (ask how to send us the papers in advance)  If your child is having surgery, please ask for a copy of Preparing for Your Child's Surgery.    Preparing for surgery    Within 30 days of surgery: Have an exam at your family clinic (primary care clinic), or go to a pre-operative clinic. This exam is called a \"History and Physical,\" or H&P.    At your H&P exam, talk to your care team about all medicines you take. If you need to stop any medicines before surgery, ask when to start taking them again.  ? We do this for your safety. Many medicines can make you bleed too much during surgery. Some change how well surgery (anesthesia) drugs work.    Call your insurance company to see what it will and won't pay for. Ask if they need to pre-approve the surgery. (If no insurance, call [phone].)    Call your surgeon's clinic if there's any change in your health. This includes signs of a cold or flu (sore throat, runny nose, cough, rash, fever). It also includes a scrape or scratch near the surgery site.    If you have questions on the day of surgery, call your surgery center.  Eating and drinking guidelines  For your safety: Unless your surgeon tells you otherwise, follow the guidelines below.    Eat and drink as usual until 8 hours before surgery. " After that, no food or milk.    Drink clear liquids until 2 hours before surgery. These are liquids you can see through, like water, Gatorade and Propel Water. You may also have black coffee and tea (no cream or milk).    Nothing by mouth within 2 hours of surgery. This includes gum, candy and breath mints.    Stop alcohol the midnight before surgery.    If your family clinic tells you to take medicine on the morning of surgery, it's okay to take it with a sip of water.  Preventing infection    Shower or bathe the night before and morning of your surgery. Follow the instructions your clinic gave you. (If no instructions, use regular soap.)    Don't shave or clip hair near your surgery site. This can lead to skin infection.    Don't smoke the morning of surgery. Smoking increases the risk of infection. You may chew nicotine gum up to 2 hours before surgery. A nicotine patch is okay.  ? Note: Some surgeries require you to completely quit smoking and nicotine. Check with your surgeon.    Your care team will make every effort to keep you safe from infection. We will:  ? Clean our hands often with soap and water (or an alcohol-based hand rub).  ? Clean the skin at your surgery site with a special soap that kills germs. We'll also remove hair from the site as needed.  ? Wear special hair covers, masks, gowns and gloves during surgery.  ? Give antibiotic medicine, if prescribed. Not all surgeries need antibiotics.  What to bring on the day of surgery    Photo ID and insurance card    Copy of your health care directive, if you have one    Glasses and hearing aides (bring cases)  ? You can't wear contacts during surgery    Inhaler and eye drops, if you use them (tell us about these when you arrive)    CPAP machine or breathing device, if you use them    A few personal items, if spending the night    If you have . . .  ? A pacemaker or ICD (cardiac defibrillator): Bring the ID card.  ? An implanted stimulator: Bring the  remote control.  ? A legal guardian: Bring a copy of the certified (court-stamped) guardianship papers.  Please remove any jewelry, including body piercings. Leave jewelry and other valuables at home.  If you're going home the day of surgery  Important: If you don't follow the rules below, we must cancel your surgery.     Arrange for someone to drive you home after surgery. You may not drive, take a taxi or take public transportation by yourself (unless you'll have local anesthesia only).    Arrange for a responsible adult to stay with you overnight. If you don't, we may keep you in the hospital overnight, and you may need to pay the costs yourself.  Questions?   If you have any questions for your care team, list them here: _________________________________________________________________________________________________________________________________________________________________________________________________________________________________________________________________________________________________________________________  For informational purposes only. Not to replace the advice of your health care provider. Copyright   4827-5071 [facility]. All rights reserved. Clinically reviewed by Yazmin Shipman MD. [employer] 120545 - REV 07/19.      Before Your Procedure or Hospital Admission  Testing for COVID-19 (Coronavirus)  Thank you for choosing [facility] for your health care needs. This is a very challenging time for everyone. The World Health Organization and the [facility] have declared the COVID-19 virus a pandemic.   Our goal is to keep you and our team here at [facility] safe and healthy. We've taken several steps to make this happen. For example:    We screen our staff, care teams and patients for COVID-19.    Everyone at [facility] must wear a mask and stay 6 feet apart.    We are limiting hospital and clinic visitors.  Before you come in  All patients must get  "tested for COVID-19. Your test needs to happen 2 to 4 days before you check in to the hospital or surgery site.   A clinic scheduler will call about a week in advance to set up a testing time at one of our labs where we'll take a swab of your nose or throat.  Note: If you go to a clinic or pharmacy like [facility] or [facility] for your test, make sure it's a \"RT-PCR\" test, not a \"rapid\" COVID-19 test. (See Questions and Answers below.)  After the test, please stay at home and stay out of contact with other people. It will be harder for you to recover if you get COVID-19 before your treatment.  Please follow all current safety guidelines, including:    Limit trips outside your home.    Limit the number of people you see.    Always wear a mask outside your home.    Use social distancing. (Stay 6 feet away from others whenever you can.)    Wash your hands often.  If your test shows you have COVID-19  If your test is positive, we'll let you know. A positive test means that you have the virus.   We'll probably have to postpone your admission, surgery or procedure. Your doctor will discuss this with you. After that, we'll let you know what to do and when you can reschedule.   We may need to cancel your treatment on short notice for other reasons, too.  If your test shows you DON'T have COVID-19  Even if your test is negative, you may still get COVID-19. It's rare but, sometimes, the test result is wrong. You could also catch the virus after taking the test.   There's a very small chance that you could catch COVID-19 in the hospital or surgery center. [facility] has taken many steps to prevent this from happening.   Day of your surgery or procedure    Please come wearing a mask or something else that covers both your nose and mouth.    When you arrive, we'll ask you some questions to find out if you have any signs or symptoms of COVID-19.    Ask your care team if you can have visitors. All visitors must wear masks and will " "be screened for signs of COVID-19.  ? Even if no visitors are allowed, you can still have with you:    Your legal guardian or legal decision maker    A parent and one other visitor, if you are younger than 18 years old    A partner and a , if you are in labor  ? We might need to teach you about taking care of yourself after surgery. If so, a visitor can come into the hospital to learn about it, too.  ? The rules for visitors change often, depending on how much the virus is spreading. To learn more, see Visiting a Loved One in the Hospital during the COVID-19 Outbreak.  Please call your care team, hospital or surgery center if you have any questions. We thank you for your understanding and for choosing [facility] for your care.   Questions and Answers  Does it matter where I get tested for COVID-19?  Yes. We urge you to get tested at one of our [facility] COVID-19 testing sites. We process these tests in our lab and can get the results quickly. Your [facility] care team needs to get your results before you check in.  What should I do if I can't get tested at [facility]?  You can get tested somewhere else, but you'll need to take these extra steps:  1. Contact your family doctor or clinic to arrange your test.  2. Take the test within 4 days of your surgery or procedure. We can't accept tests older than 4 days.  3. Make sure your doctor or clinic faxes your results to [facility] at [phone].  If we don't get your results in time, we may have to postpone or cancel your treatment.  Ask if you're getting a \"RT-PCR\" COVID-19 test. It should NOT be a \"rapid\" COVID-19 test. Many drug stores use \"rapid\" tests, but they may not be as accurate. We don't accept the results of \"rapid\" tests.  For informational purposes only. Not to replace the advice of your health care provider. Copyright   2020 [facility]. All rights reserved. Clinically reviewed by In[facility] [employer] " and the [facility] COVID-19 Clinical Team. [employer] [id number] - Rev 10/07/20.

## 2021-06-25 ASSESSMENT — ANXIETY QUESTIONNAIRES: GAD7 TOTAL SCORE: 3

## 2021-06-25 NOTE — ED TRIAGE NOTES
Presents with complaint of left calf pain/tenderness that onset at 0500 today. She had the J&J vaccine on Friday. Denies shortness of breath or chest pain.

## 2021-06-26 NOTE — ED PROVIDER NOTES
EMERGENCY DEPARTMENT ENCOUNTER      NAME: Paradise Narvaez  AGE: 46 y.o. female  YOB: 1975  MRN: [id number]  EVALUATION DATE & TIME: 2021  4:17 PM    PCP: Zuly Gardner MD    ED PROVIDER: Carmen Pan PA-C      Chief Complaint   Patient presents with     Leg Pain         FINAL IMPRESSION:  1. Pain of left lower leg          MEDICAL DECISION MAKIN y.o. female who received the [employer] Enrrique COVID-19 vaccine 5 days ago presents to the emergency department for evaluation of left calf discomfort since this morning.  There have been reports of DVT following this vaccine patient is worried about this.  No history of clots.     Vital signs within normal range.  On exam, patient appears well and is resting comfortably.  She has no visible swelling to the left calf.  She has no tenderness.  Negative Homans' sign.  No skin erythema, warmth, lesions noted.  Strong and equal DP pulses, sensation bilateral lower extremities intact.  Full range of motion of all joints.  Compartments of the leg are soft and nontender, therefore I do not believe this represents compartment syndrome.  No signs of cellulitis or other infectious process.  Patient denies preceding injury but symptoms could be secondary to a gastrocnemius strain.  No sign of complete muscle tear.  No clinical signs of DVT but with this recent vaccine, will obtain ultrasound to rule this out.  She has no chest pain, shortness of breath, tachycardia, hypoxia to suggest PE.  With no muscular tenderness, low suspicion for rhabdomyolysis or myositis.  I do not believe any labs are indicated at this time.  Patient is declining analgesia.    Venous ultrasound of the left leg is negative for DVT or other abnormalities.  I do not believe further emergent work-up is indicated at this time.  Patient feels comfortable returning home with outpatient primary care follow-up.  I discussed supportive cares, including RICE and compression.  I discussed the  signs of DVT with the patient and other strict return precautions.  Patient is agreeable and discharged in a comfortable, ambulatory state.      At the conclusion of the encounter I discussed the results of all of the tests and the disposition. The questions were answered. The patient or family acknowledged understanding and was agreeable with the care plan.       ED COURSE:  5:06 PM I met with the patient, obtained history, performed an initial exam, and discussed options and plan for diagnostics and treatment here in the ED.   5:09 PM We discussed plans for discharge including supportive cares, symptomatic treatment, outpatient follow up, and reasons to return to the emergency department.     MEDICATIONS GIVEN IN THE EMERGENCY:  Medications - No data to display    NEW PRESCRIPTIONS STARTED AT TODAY'S ER VISIT  Current Discharge Medication List      CONTINUE these medications which have NOT CHANGED    Details   CALCIUM-VITAMIN D3 ORAL Take 1,000 Int'l Units by mouth.      cyclobenzaprine (FLEXERIL) 5 MG tablet Take 1-2 tablets (5-10 mg total) by mouth 3 (three) times a day as needed for muscle spasms (back/neck pain).  Qty: 30 tablet, Refills: 0    Associated Diagnoses: Muscle spasm; Acute right-sided thoracic back pain      LORazepam (ATIVAN) 0.5 MG tablet TK 1-2 TS PO  BID PRN FOR PANIC      tranexamic acid 650 mg Tab TK 2 TS PO TID  Refills: 4      vilazodone (VIIBRYD) 40 mg Tab tablet                 =================================================================    HPI    Patient information was obtained from: Patient     Use of : N/A         Paradise Narvaez is a 46 y.o. female with a pertinent history of anxiety who presents to this ED for evaluation of leg problem.    About 5 days ago patient received Enrrique and Enrrique vaccine. Today at about 5:00 AM (12 hours ago) patient felt a fullness at her posterior left calf. No pain or discomfort. She was able to ignore it until 1:00 PM (4 hours ago)  "where it became more \"distracting\" and also started feeling cold. She has never felt this before. Notes previous faith horses on her feet, but never calf. Patient denies numbness, tingling, injuries, or any other complaints.      REVIEW OF SYSTEMS   Review of Systems   Musculoskeletal:        Positive for left posterior calf fullness and cold sensation. No pain or discomfort. No injuries.   Neurological: Negative for numbness.        No tingling.   All other systems reviewed and are negative.      PAST MEDICAL HISTORY:  Past Medical History:   Diagnosis Date     Anxiety      Depression      Iron deficiency anemia due to chronic blood loss 2010    from Menorrhagia.  Treated with IV iron.       PAST SURGICAL HISTORY:  Past Surgical History:   Procedure Laterality Date     BREAST SURGERY      Augmentation     VA HYSTEROSCOPY,W/ENDOMETRIAL ABLATION N/A 10/21/2020    Procedure: HYSTEROSCOPY, NOVASURE ABLATION;  Surgeon: Hollie Callejas MD;  Location: [facility];  Service: Gynecology     PUDENDAL NERVE DECOMPRESION Bilateral 03/31/2008    With bilateral fasciotomy of the Alcock canal and fasciotomy of sacrao-spinous ligament.       CURRENT MEDICATIONS:    No current facility-administered medications on file prior to encounter.      Current Outpatient Medications on File Prior to Encounter   Medication Sig     CALCIUM-VITAMIN D3 ORAL Take 1,000 Int'l Units by mouth.     cyclobenzaprine (FLEXERIL) 5 MG tablet Take 1-2 tablets (5-10 mg total) by mouth 3 (three) times a day as needed for muscle spasms (back/neck pain).     LORazepam (ATIVAN) 0.5 MG tablet TK 1-2 TS PO  BID PRN FOR PANIC     tranexamic acid 650 mg Tab TK 2 TS PO TID     vilazodone (VIIBRYD) 40 mg Tab tablet        ALLERGIES:  No Known Allergies    FAMILY HISTORY:  Family History   Problem Relation Age of Onset     Depression Mother      Osteoporosis Mother      Prostate cancer Father 70     Other Brother         spinal stenosis     No Medical " Problems Daughter      No Medical Problems Son      Hypertension Maternal Grandmother      Breast cancer Paternal Grandmother 65     Heart disease Paternal Grandfather         PAD     No Medical Problems Daughter      Other Brother         spinal stenosis       SOCIAL HISTORY:   Social History     Socioeconomic History     Marital status:      Spouse name: Not on file     Number of children: Not on file     Years of education: Not on file     Highest education level: Not on file   Occupational History     Occupation: Hospicecare coordinator.     Employer: [employer]   Social Needs     Financial resource strain: Not on file     Food insecurity     Worry: Not on file     Inability: Not on file     Transportation needs     Medical: Not on file     Non-medical: Not on file   Tobacco Use     Smoking status: Never Smoker     Smokeless tobacco: Never Used   Substance and Sexual Activity     Alcohol use: Yes     Alcohol/week: 3.0 standard drinks     Types: 3 Standard drinks or equivalent per week     Drug use: No     Sexual activity: Not on file   Lifestyle     Physical activity     Days per week: Not on file     Minutes per session: Not on file     Stress: Not on file   Relationships     Social connections     Talks on phone: Not on file     Gets together: Not on file     Attends [employer] service: Not on file     Active member of club or organization: Not on file     Attends meetings of clubs or organizations: Not on file     Relationship status: Not on file     Intimate partner violence     Fear of current or ex partner: Not on file     Emotionally abused: Not on file     Physically abused: Not on file     Forced sexual activity: Not on file   Other Topics Concern     Not on file   Social History Narrative     Not on file       VITALS:  Patient Vitals for the past 24 hrs:   BP Temp Temp src Pulse Resp SpO2 Weight   06/08/21 1559 132/89 97.9  F (36.6  C) Temporal 75 18 99 % 126 lb (57.2 kg)       PHYSICAL EXAM     General Appearance:  Well developed, well nourished. Alert, cooperative, no acute distress, appears stated age.   HENT: Normocephalic without obvious deformity, atraumatic. Mucous membranes moist. Oropharynx without erythema, exudate, uvular deviation, or soft palate edema. Neck is supple, no cervical lymphadenopathy, no nuchal rigidity. Normal left and right TM.   Eyes: Conjunctiva clear. Lids normal. No discharge.   Respiratory: No distress. Lungs clear to ausculation bilaterally. No wheezes, rhonchi, rales, or stridor.  Cardiovascular: Regular rate and rhythm. No gallops, rubs, or murmurs.  DP pulses are 2+ bilaterally.  Capillary refill less than 2 seconds. No peripheral edema.   GI: Abdomen soft, nontender, normal bowel sounds. No rebound or guarding.   Musculoskeletal: Moving all extremities. No swelling.  No calf tenderness.  Negative Homans' sign.  Compartments of the legs are soft nontender.  No deformity. Able to ambulate independently.   Integument: Warm, dry, no rashes, petechiae, or lesions.   Neurologic: Alert and orientated x3. No focal deficits. Normal motor function. Normal sensory function. GCS 15.   Psych: Normal mood and affect. Judgement normal.      RADIOLOGY:  Reviewed all pertinent imaging. Please see official radiology report.  Us Venous Leg Left    Result Date: 6/8/2021  EXAM: US VENOUS LEG LEFT LOCATION: [facility] DATE/TIME: 6/8/2021 4:46 PM INDICATION: Left calf swelling COMPARISON: None. TECHNIQUE: Venous Duplex ultrasound of the left lower extremity with and without compression, augmentation and duplex. Color flow and spectral Doppler with waveform analysis performed. FINDINGS: Exam includes the common femoral, femoral, popliteal, and contralateral common femoral veins as well as segmentally visualized deep calf veins and greater saphenous vein. LEFT: No deep vein thrombosis. No superficial thrombophlebitis. No popliteal cyst.     1.  No deep venous  thrombosis in the left lower extremity.      I, Amanda Maravilla, am serving as a scribe to document services personally performed by Carmen Pan PA-C based on my observation and the provider's statements to me. I, Carmen Pan PA-C, attest that Amanda Maravilla is acting in a scribe capacity, has observed my performance of the services and has documented them in accordance with my direction.      This note has been dictated using voice recognition software. Any grammatical or context distortions are unintentional and inherent to the software.      Carmen Pan PA-C  Emergency Medicine  [facility] EMERGENCY ROOM  On license of [facility] [facility] [address]  Dept: [phone]  Loc: [phone]     Carmen Marshall PA-C  06/08/21 9056

## 2021-07-06 VITALS — BODY MASS INDEX: 19.73 KG/M2 | WEIGHT: 126 LBS

## 2021-09-18 ENCOUNTER — HEALTH MAINTENANCE LETTER (OUTPATIENT)
Age: 46
End: 2021-09-18

## 2021-09-23 ENCOUNTER — TRANSFERRED RECORDS (OUTPATIENT)
Dept: HEALTH INFORMATION MANAGEMENT | Facility: CLINIC | Age: 46
End: 2021-09-23

## 2022-06-25 ENCOUNTER — HEALTH MAINTENANCE LETTER (OUTPATIENT)
Age: 47
End: 2022-06-25

## 2022-11-20 ENCOUNTER — HEALTH MAINTENANCE LETTER (OUTPATIENT)
Age: 47
End: 2022-11-20

## 2023-07-02 ENCOUNTER — HEALTH MAINTENANCE LETTER (OUTPATIENT)
Age: 48
End: 2023-07-02